# Patient Record
Sex: FEMALE | Race: WHITE | NOT HISPANIC OR LATINO | URBAN - METROPOLITAN AREA
[De-identification: names, ages, dates, MRNs, and addresses within clinical notes are randomized per-mention and may not be internally consistent; named-entity substitution may affect disease eponyms.]

---

## 2015-06-23 RX ORDER — DOCUSATE SODIUM 100 MG
2 CAPSULE ORAL
Qty: 0 | Refills: 0 | COMMUNITY
Start: 2015-06-23

## 2017-05-30 ENCOUNTER — INPATIENT (INPATIENT)
Facility: HOSPITAL | Age: 82
LOS: 2 days | Discharge: NURSING, REHAB OR LONG-TERM CARE | DRG: 312 | End: 2017-06-02
Attending: INTERNAL MEDICINE | Admitting: INTERNAL MEDICINE
Payer: MEDICARE

## 2017-05-30 VITALS
TEMPERATURE: 98 F | HEIGHT: 62 IN | RESPIRATION RATE: 16 BRPM | WEIGHT: 145.06 LBS | HEART RATE: 66 BPM | DIASTOLIC BLOOD PRESSURE: 44 MMHG | OXYGEN SATURATION: 99 % | SYSTOLIC BLOOD PRESSURE: 136 MMHG

## 2017-05-30 DIAGNOSIS — Z98.89 OTHER SPECIFIED POSTPROCEDURAL STATES: Chronic | ICD-10-CM

## 2017-05-30 DIAGNOSIS — R55 SYNCOPE AND COLLAPSE: ICD-10-CM

## 2017-05-30 LAB
ALBUMIN SERPL ELPH-MCNC: 3.6 G/DL — SIGNIFICANT CHANGE UP (ref 3.3–5)
ALP SERPL-CCNC: 85 U/L — SIGNIFICANT CHANGE UP (ref 30–120)
ALT FLD-CCNC: 15 U/L DA — SIGNIFICANT CHANGE UP (ref 10–60)
ANION GAP SERPL CALC-SCNC: 5 MMOL/L — SIGNIFICANT CHANGE UP (ref 5–17)
ANION GAP SERPL CALC-SCNC: 6 MMOL/L — SIGNIFICANT CHANGE UP (ref 5–17)
APPEARANCE UR: CLEAR — SIGNIFICANT CHANGE UP
APTT BLD: 32.1 SEC — SIGNIFICANT CHANGE UP (ref 27.5–37.4)
AST SERPL-CCNC: 16 U/L — SIGNIFICANT CHANGE UP (ref 10–40)
BASOPHILS # BLD AUTO: 0.1 K/UL — SIGNIFICANT CHANGE UP (ref 0–0.2)
BASOPHILS NFR BLD AUTO: 1.1 % — SIGNIFICANT CHANGE UP (ref 0–2)
BILIRUB SERPL-MCNC: 0.2 MG/DL — SIGNIFICANT CHANGE UP (ref 0.2–1.2)
BILIRUB UR-MCNC: NEGATIVE — SIGNIFICANT CHANGE UP
BUN SERPL-MCNC: 26 MG/DL — HIGH (ref 7–23)
BUN SERPL-MCNC: 29 MG/DL — HIGH (ref 7–23)
CALCIUM SERPL-MCNC: 8.6 MG/DL — SIGNIFICANT CHANGE UP (ref 8.4–10.5)
CALCIUM SERPL-MCNC: 9.4 MG/DL — SIGNIFICANT CHANGE UP (ref 8.4–10.5)
CHLORIDE SERPL-SCNC: 106 MMOL/L — SIGNIFICANT CHANGE UP (ref 96–108)
CHLORIDE SERPL-SCNC: 99 MMOL/L — SIGNIFICANT CHANGE UP (ref 96–108)
CK MB BLD-MCNC: 2.1 % — SIGNIFICANT CHANGE UP (ref 0–3.5)
CK MB CFR SERPL CALC: 1.6 NG/ML — SIGNIFICANT CHANGE UP (ref 0–3.6)
CK SERPL-CCNC: 75 U/L — SIGNIFICANT CHANGE UP (ref 26–192)
CO2 SERPL-SCNC: 26 MMOL/L — SIGNIFICANT CHANGE UP (ref 22–31)
CO2 SERPL-SCNC: 29 MMOL/L — SIGNIFICANT CHANGE UP (ref 22–31)
COLOR SPEC: YELLOW — SIGNIFICANT CHANGE UP
CREAT SERPL-MCNC: 1.4 MG/DL — HIGH (ref 0.5–1.3)
CREAT SERPL-MCNC: 1.56 MG/DL — HIGH (ref 0.5–1.3)
DIFF PNL FLD: NEGATIVE — SIGNIFICANT CHANGE UP
EOSINOPHIL # BLD AUTO: 0.1 K/UL — SIGNIFICANT CHANGE UP (ref 0–0.5)
EOSINOPHIL NFR BLD AUTO: 2.1 % — SIGNIFICANT CHANGE UP (ref 0–6)
GLUCOSE SERPL-MCNC: 158 MG/DL — HIGH (ref 70–99)
GLUCOSE SERPL-MCNC: 72 MG/DL — SIGNIFICANT CHANGE UP (ref 70–99)
GLUCOSE UR QL: NEGATIVE MG/DL — SIGNIFICANT CHANGE UP
HCT VFR BLD CALC: 29.7 % — LOW (ref 34.5–45)
HGB BLD-MCNC: 9.8 G/DL — LOW (ref 11.5–15.5)
INR BLD: 0.99 RATIO — SIGNIFICANT CHANGE UP (ref 0.88–1.16)
KETONES UR-MCNC: NEGATIVE — SIGNIFICANT CHANGE UP
LACTATE SERPL-SCNC: 1.3 MMOL/L — SIGNIFICANT CHANGE UP (ref 0.7–2)
LEUKOCYTE ESTERASE UR-ACNC: NEGATIVE — SIGNIFICANT CHANGE UP
LYMPHOCYTES # BLD AUTO: 1.1 K/UL — SIGNIFICANT CHANGE UP (ref 1–3.3)
LYMPHOCYTES # BLD AUTO: 17.3 % — SIGNIFICANT CHANGE UP (ref 13–44)
MAGNESIUM SERPL-MCNC: 2.1 MG/DL — SIGNIFICANT CHANGE UP (ref 1.6–2.6)
MCHC RBC-ENTMCNC: 29 PG — SIGNIFICANT CHANGE UP (ref 27–34)
MCHC RBC-ENTMCNC: 32.8 GM/DL — SIGNIFICANT CHANGE UP (ref 32–36)
MCV RBC AUTO: 88.1 FL — SIGNIFICANT CHANGE UP (ref 80–100)
MONOCYTES # BLD AUTO: 0.7 K/UL — SIGNIFICANT CHANGE UP (ref 0–0.9)
MONOCYTES NFR BLD AUTO: 10.6 % — SIGNIFICANT CHANGE UP (ref 2–14)
NEUTROPHILS # BLD AUTO: 4.4 K/UL — SIGNIFICANT CHANGE UP (ref 1.8–7.4)
NEUTROPHILS NFR BLD AUTO: 68.9 % — SIGNIFICANT CHANGE UP (ref 43–77)
NITRITE UR-MCNC: NEGATIVE — SIGNIFICANT CHANGE UP
PH UR: 7 — SIGNIFICANT CHANGE UP (ref 5–8)
PLATELET # BLD AUTO: 216 K/UL — SIGNIFICANT CHANGE UP (ref 150–400)
POTASSIUM SERPL-MCNC: 4.6 MMOL/L — SIGNIFICANT CHANGE UP (ref 3.5–5.3)
POTASSIUM SERPL-MCNC: 6.2 MMOL/L — CRITICAL HIGH (ref 3.5–5.3)
POTASSIUM SERPL-SCNC: 4.6 MMOL/L — SIGNIFICANT CHANGE UP (ref 3.5–5.3)
POTASSIUM SERPL-SCNC: 6.2 MMOL/L — CRITICAL HIGH (ref 3.5–5.3)
PROT SERPL-MCNC: 7.5 G/DL — SIGNIFICANT CHANGE UP (ref 6–8.3)
PROT UR-MCNC: NEGATIVE MG/DL — SIGNIFICANT CHANGE UP
PROTHROM AB SERPL-ACNC: 10.8 SEC — SIGNIFICANT CHANGE UP (ref 9.8–12.7)
RBC # BLD: 3.38 M/UL — LOW (ref 3.8–5.2)
RBC # FLD: 13.8 % — SIGNIFICANT CHANGE UP (ref 10.3–14.5)
SODIUM SERPL-SCNC: 131 MMOL/L — LOW (ref 135–145)
SODIUM SERPL-SCNC: 140 MMOL/L — SIGNIFICANT CHANGE UP (ref 135–145)
SP GR SPEC: 1 — LOW (ref 1.01–1.02)
TROPONIN I SERPL-MCNC: 0 NG/ML — LOW (ref 0.02–0.06)
UROBILINOGEN FLD QL: NEGATIVE MG/DL — SIGNIFICANT CHANGE UP
WBC # BLD: 6.4 K/UL — SIGNIFICANT CHANGE UP (ref 3.8–10.5)
WBC # FLD AUTO: 6.4 K/UL — SIGNIFICANT CHANGE UP (ref 3.8–10.5)

## 2017-05-30 PROCEDURE — 93010 ELECTROCARDIOGRAM REPORT: CPT

## 2017-05-30 PROCEDURE — 70450 CT HEAD/BRAIN W/O DYE: CPT | Mod: 26

## 2017-05-30 PROCEDURE — 99285 EMERGENCY DEPT VISIT HI MDM: CPT

## 2017-05-30 PROCEDURE — 71010: CPT | Mod: 26

## 2017-05-30 RX ORDER — DEXTROSE 50 % IN WATER 50 %
50 SYRINGE (ML) INTRAVENOUS ONCE
Qty: 0 | Refills: 0 | Status: COMPLETED | OUTPATIENT
Start: 2017-05-30 | End: 2017-05-30

## 2017-05-30 RX ORDER — ALBUTEROL 90 UG/1
2.5 AEROSOL, METERED ORAL ONCE
Qty: 0 | Refills: 0 | Status: COMPLETED | OUTPATIENT
Start: 2017-05-30 | End: 2017-05-30

## 2017-05-30 RX ORDER — SODIUM BICARBONATE 1 MEQ/ML
50 SYRINGE (ML) INTRAVENOUS ONCE
Qty: 0 | Refills: 0 | Status: COMPLETED | OUTPATIENT
Start: 2017-05-30 | End: 2017-05-30

## 2017-05-30 RX ORDER — SODIUM CHLORIDE 9 MG/ML
1000 INJECTION INTRAMUSCULAR; INTRAVENOUS; SUBCUTANEOUS ONCE
Qty: 0 | Refills: 0 | Status: COMPLETED | OUTPATIENT
Start: 2017-05-30 | End: 2017-05-30

## 2017-05-30 RX ORDER — INSULIN HUMAN 100 [IU]/ML
6 INJECTION, SOLUTION SUBCUTANEOUS ONCE
Qty: 0 | Refills: 0 | Status: COMPLETED | OUTPATIENT
Start: 2017-05-30 | End: 2017-05-30

## 2017-05-30 RX ADMIN — SODIUM CHLORIDE 1000 MILLILITER(S): 9 INJECTION INTRAMUSCULAR; INTRAVENOUS; SUBCUTANEOUS at 19:30

## 2017-05-30 RX ADMIN — Medication 50 MILLILITER(S): at 21:00

## 2017-05-30 RX ADMIN — INSULIN HUMAN 6 UNIT(S): 100 INJECTION, SOLUTION SUBCUTANEOUS at 21:13

## 2017-05-30 RX ADMIN — Medication 50 MILLIEQUIVALENT(S): at 20:55

## 2017-05-30 RX ADMIN — SODIUM CHLORIDE 1000 MILLILITER(S): 9 INJECTION INTRAMUSCULAR; INTRAVENOUS; SUBCUTANEOUS at 21:09

## 2017-05-30 RX ADMIN — Medication 50 MILLIEQUIVALENT(S): at 21:07

## 2017-05-30 RX ADMIN — ALBUTEROL 2.5 MILLIGRAM(S): 90 AEROSOL, METERED ORAL at 21:23

## 2017-05-30 NOTE — ED ADULT NURSE NOTE - CHPI ED SYMPTOMS NEG
no decreased eating/drinking/no vomiting/no fever/no numbness/no nausea/no tingling/no chills/no pain

## 2017-05-30 NOTE — ED PROVIDER NOTE - PMH
Dementia    Diabetes    Diabetes mellitus    Esophageal ulceration    High cholesterol    Hyperlipidemia    Hypertension    Hypertension    Hypothyroid    Hypothyroid    Sleep apnea

## 2017-05-30 NOTE — ED PROVIDER NOTE - NS ED MD SCRIBE ATTENDING SCRIBE SECTIONS
HISTORY OF PRESENT ILLNESS/DISPOSITION/PHYSICAL EXAM/VITAL SIGNS( Pullset)/REVIEW OF SYSTEMS/PAST MEDICAL/SURGICAL/SOCIAL HISTORY

## 2017-05-30 NOTE — ED PROVIDER NOTE - OBJECTIVE STATEMENT
89 y/o F pt with hx of HTN, HLD, Diabetes, Dementia presents to ED BIBA from nursing home for a period of unresponsiveness per EMS. Per EMS pt has been weak and not acting herself. Hx is limited from pt secondary to medical hx of Dementia.

## 2017-05-30 NOTE — ED PROVIDER NOTE - NEUROLOGICAL, MLM
Alert, following commands.  no focal deficits, no motor or sensory deficits. wakens to voice, drowsy, following simple commands.  no focal deficits, no motor or sensory deficits.

## 2017-05-31 DIAGNOSIS — R53.1 WEAKNESS: ICD-10-CM

## 2017-05-31 DIAGNOSIS — F32.9 MAJOR DEPRESSIVE DISORDER, SINGLE EPISODE, UNSPECIFIED: ICD-10-CM

## 2017-05-31 DIAGNOSIS — I10 ESSENTIAL (PRIMARY) HYPERTENSION: ICD-10-CM

## 2017-05-31 DIAGNOSIS — E78.00 PURE HYPERCHOLESTEROLEMIA, UNSPECIFIED: ICD-10-CM

## 2017-05-31 DIAGNOSIS — E03.9 HYPOTHYROIDISM, UNSPECIFIED: ICD-10-CM

## 2017-05-31 DIAGNOSIS — N17.9 ACUTE KIDNEY FAILURE, UNSPECIFIED: ICD-10-CM

## 2017-05-31 DIAGNOSIS — G47.30 SLEEP APNEA, UNSPECIFIED: ICD-10-CM

## 2017-05-31 DIAGNOSIS — F03.90 UNSPECIFIED DEMENTIA, UNSPECIFIED SEVERITY, WITHOUT BEHAVIORAL DISTURBANCE, PSYCHOTIC DISTURBANCE, MOOD DISTURBANCE, AND ANXIETY: ICD-10-CM

## 2017-05-31 DIAGNOSIS — E11.9 TYPE 2 DIABETES MELLITUS WITHOUT COMPLICATIONS: ICD-10-CM

## 2017-05-31 DIAGNOSIS — R55 SYNCOPE AND COLLAPSE: ICD-10-CM

## 2017-05-31 DIAGNOSIS — E87.5 HYPERKALEMIA: ICD-10-CM

## 2017-05-31 LAB
HBA1C BLD-MCNC: 6 % — HIGH (ref 4–5.6)
HCT VFR BLD CALC: 30.3 % — LOW (ref 34.5–45)
HGB BLD-MCNC: 9.7 G/DL — LOW (ref 11.5–15.5)
MCHC RBC-ENTMCNC: 27.7 PG — SIGNIFICANT CHANGE UP (ref 27–34)
MCHC RBC-ENTMCNC: 32 GM/DL — SIGNIFICANT CHANGE UP (ref 32–36)
MCV RBC AUTO: 86.7 FL — SIGNIFICANT CHANGE UP (ref 80–100)
PLATELET # BLD AUTO: 213 K/UL — SIGNIFICANT CHANGE UP (ref 150–400)
RBC # BLD: 3.5 M/UL — LOW (ref 3.8–5.2)
RBC # FLD: 14.4 % — SIGNIFICANT CHANGE UP (ref 10.3–14.5)
TROPONIN I SERPL-MCNC: 0 NG/ML — LOW (ref 0.02–0.06)
WBC # BLD: 5 K/UL — SIGNIFICANT CHANGE UP (ref 3.8–10.5)
WBC # FLD AUTO: 5 K/UL — SIGNIFICANT CHANGE UP (ref 3.8–10.5)

## 2017-05-31 PROCEDURE — 76775 US EXAM ABDO BACK WALL LIM: CPT | Mod: 26

## 2017-05-31 PROCEDURE — 99223 1ST HOSP IP/OBS HIGH 75: CPT | Mod: AI

## 2017-05-31 RX ORDER — ONDANSETRON 8 MG/1
4 TABLET, FILM COATED ORAL EVERY 8 HOURS
Qty: 0 | Refills: 0 | Status: DISCONTINUED | OUTPATIENT
Start: 2017-05-31 | End: 2017-06-02

## 2017-05-31 RX ORDER — DONEPEZIL HYDROCHLORIDE 10 MG/1
10 TABLET, FILM COATED ORAL AT BEDTIME
Qty: 0 | Refills: 0 | Status: DISCONTINUED | OUTPATIENT
Start: 2017-05-31 | End: 2017-05-31

## 2017-05-31 RX ORDER — METOPROLOL TARTRATE 50 MG
12.5 TABLET ORAL
Qty: 0 | Refills: 0 | Status: DISCONTINUED | OUTPATIENT
Start: 2017-05-31 | End: 2017-06-02

## 2017-05-31 RX ORDER — LEVOTHYROXINE SODIUM 125 MCG
75 TABLET ORAL DAILY
Qty: 0 | Refills: 0 | Status: DISCONTINUED | OUTPATIENT
Start: 2017-05-31 | End: 2017-06-02

## 2017-05-31 RX ORDER — DEXTROSE 50 % IN WATER 50 %
25 SYRINGE (ML) INTRAVENOUS ONCE
Qty: 0 | Refills: 0 | Status: DISCONTINUED | OUTPATIENT
Start: 2017-05-31 | End: 2017-06-02

## 2017-05-31 RX ORDER — SERTRALINE 25 MG/1
50 TABLET, FILM COATED ORAL DAILY
Qty: 0 | Refills: 0 | Status: DISCONTINUED | OUTPATIENT
Start: 2017-05-31 | End: 2017-06-02

## 2017-05-31 RX ORDER — SODIUM CHLORIDE 9 MG/ML
1000 INJECTION INTRAMUSCULAR; INTRAVENOUS; SUBCUTANEOUS
Qty: 0 | Refills: 0 | Status: DISCONTINUED | OUTPATIENT
Start: 2017-05-31 | End: 2017-06-01

## 2017-05-31 RX ORDER — OXYBUTYNIN CHLORIDE 5 MG
5 TABLET ORAL DAILY
Qty: 0 | Refills: 0 | Status: DISCONTINUED | OUTPATIENT
Start: 2017-05-31 | End: 2017-06-02

## 2017-05-31 RX ORDER — MODAFINIL 200 MG/1
200 TABLET ORAL
Qty: 0 | Refills: 0 | Status: DISCONTINUED | OUTPATIENT
Start: 2017-05-31 | End: 2017-06-02

## 2017-05-31 RX ORDER — GLUCAGON INJECTION, SOLUTION 0.5 MG/.1ML
1 INJECTION, SOLUTION SUBCUTANEOUS ONCE
Qty: 0 | Refills: 0 | Status: DISCONTINUED | OUTPATIENT
Start: 2017-05-31 | End: 2017-06-02

## 2017-05-31 RX ORDER — CHOLESTYRAMINE 4 G/9G
4 POWDER, FOR SUSPENSION ORAL DAILY
Qty: 0 | Refills: 0 | Status: DISCONTINUED | OUTPATIENT
Start: 2017-05-31 | End: 2017-06-02

## 2017-05-31 RX ORDER — DOCUSATE SODIUM 100 MG
200 CAPSULE ORAL DAILY
Qty: 0 | Refills: 0 | Status: DISCONTINUED | OUTPATIENT
Start: 2017-05-31 | End: 2017-06-02

## 2017-05-31 RX ORDER — PANTOPRAZOLE SODIUM 20 MG/1
40 TABLET, DELAYED RELEASE ORAL
Qty: 0 | Refills: 0 | Status: DISCONTINUED | OUTPATIENT
Start: 2017-05-31 | End: 2017-06-02

## 2017-05-31 RX ORDER — DEXTROSE 50 % IN WATER 50 %
12.5 SYRINGE (ML) INTRAVENOUS ONCE
Qty: 0 | Refills: 0 | Status: DISCONTINUED | OUTPATIENT
Start: 2017-05-31 | End: 2017-06-02

## 2017-05-31 RX ORDER — DEXTROSE 50 % IN WATER 50 %
1 SYRINGE (ML) INTRAVENOUS ONCE
Qty: 0 | Refills: 0 | Status: DISCONTINUED | OUTPATIENT
Start: 2017-05-31 | End: 2017-06-02

## 2017-05-31 RX ORDER — HEPARIN SODIUM 5000 [USP'U]/ML
5000 INJECTION INTRAVENOUS; SUBCUTANEOUS EVERY 8 HOURS
Qty: 0 | Refills: 0 | Status: DISCONTINUED | OUTPATIENT
Start: 2017-05-31 | End: 2017-06-02

## 2017-05-31 RX ORDER — INSULIN LISPRO 100/ML
VIAL (ML) SUBCUTANEOUS
Qty: 0 | Refills: 0 | Status: DISCONTINUED | OUTPATIENT
Start: 2017-05-31 | End: 2017-06-02

## 2017-05-31 RX ORDER — ENOXAPARIN SODIUM 100 MG/ML
40 INJECTION SUBCUTANEOUS DAILY
Qty: 0 | Refills: 0 | Status: DISCONTINUED | OUTPATIENT
Start: 2017-05-31 | End: 2017-05-31

## 2017-05-31 RX ORDER — FLUTICASONE PROPIONATE 50 MCG
1 SPRAY, SUSPENSION NASAL DAILY
Qty: 0 | Refills: 0 | Status: DISCONTINUED | OUTPATIENT
Start: 2017-05-31 | End: 2017-06-02

## 2017-05-31 RX ORDER — LISINOPRIL 2.5 MG/1
20 TABLET ORAL DAILY
Qty: 0 | Refills: 0 | Status: DISCONTINUED | OUTPATIENT
Start: 2017-05-31 | End: 2017-05-31

## 2017-05-31 RX ORDER — SODIUM CHLORIDE 9 MG/ML
1000 INJECTION, SOLUTION INTRAVENOUS
Qty: 0 | Refills: 0 | Status: DISCONTINUED | OUTPATIENT
Start: 2017-05-31 | End: 2017-06-02

## 2017-05-31 RX ORDER — FOLIC ACID 0.8 MG
1 TABLET ORAL DAILY
Qty: 0 | Refills: 0 | Status: DISCONTINUED | OUTPATIENT
Start: 2017-05-31 | End: 2017-06-02

## 2017-05-31 RX ADMIN — SERTRALINE 50 MILLIGRAM(S): 25 TABLET, FILM COATED ORAL at 11:56

## 2017-05-31 RX ADMIN — Medication 200 MILLIGRAM(S): at 11:56

## 2017-05-31 RX ADMIN — Medication 1 TABLET(S): at 17:55

## 2017-05-31 RX ADMIN — Medication 12.5 MILLIGRAM(S): at 17:55

## 2017-05-31 RX ADMIN — Medication 1 TABLET(S): at 05:52

## 2017-05-31 RX ADMIN — ONDANSETRON 4 MILLIGRAM(S): 8 TABLET, FILM COATED ORAL at 01:05

## 2017-05-31 RX ADMIN — PANTOPRAZOLE SODIUM 40 MILLIGRAM(S): 20 TABLET, DELAYED RELEASE ORAL at 05:58

## 2017-05-31 RX ADMIN — Medication 1 MILLIGRAM(S): at 11:56

## 2017-05-31 RX ADMIN — HEPARIN SODIUM 5000 UNIT(S): 5000 INJECTION INTRAVENOUS; SUBCUTANEOUS at 05:52

## 2017-05-31 RX ADMIN — Medication 5 MILLIGRAM(S): at 11:56

## 2017-05-31 RX ADMIN — Medication 12.5 MILLIGRAM(S): at 05:57

## 2017-05-31 RX ADMIN — LISINOPRIL 20 MILLIGRAM(S): 2.5 TABLET ORAL at 05:54

## 2017-05-31 RX ADMIN — CHOLESTYRAMINE 4 GRAM(S): 4 POWDER, FOR SUSPENSION ORAL at 08:26

## 2017-05-31 RX ADMIN — Medication 1 SPRAY(S): at 11:57

## 2017-05-31 RX ADMIN — HEPARIN SODIUM 5000 UNIT(S): 5000 INJECTION INTRAVENOUS; SUBCUTANEOUS at 14:39

## 2017-05-31 RX ADMIN — SODIUM CHLORIDE 50 MILLILITER(S): 9 INJECTION INTRAMUSCULAR; INTRAVENOUS; SUBCUTANEOUS at 22:51

## 2017-05-31 RX ADMIN — MODAFINIL 200 MILLIGRAM(S): 200 TABLET ORAL at 05:57

## 2017-05-31 RX ADMIN — Medication 75 MICROGRAM(S): at 05:53

## 2017-05-31 NOTE — DISCHARGE NOTE ADULT - CARE PROVIDER_API CALL
Daniela Alas (MBBS; MD), Internal Medicine  [address] [place] [address]  Phone: [phone]  Fax: [phone]

## 2017-05-31 NOTE — DISCHARGE NOTE ADULT - NS AS DC STROKE ED MATERIALS
Stroke Education Booklet/Call 911 for Stroke/Risk Factors for Stroke/High Blood pressure is a risk factor for Strokes/Stroke Warning Signs and Symptoms/Need for Followup After Discharge/Prescribed Medications High Blood pressure is a risk factor for Strokes

## 2017-05-31 NOTE — H&P ADULT - NSHPPHYSICALEXAM_GEN_ALL_CORE
PHYSICAL EXAM-  GENERAL: NAD, average groomed, well-developed  HEAD:  Atraumatic, Normocephalic  EYES: EOMI, PERRLA, conjunctiva and sclera clear  NECK: Supple, No JVD, Normal thyroid  NERVOUS SYSTEM:  Alert & Oriented X3, Motor Strength 3 -4 /5 B/L upper and lower extremities; DTRs 2+ intact and symmetric  CHEST/LUNG: Clear to percussion bilaterally; No rales, rhonchi, wheezing, or rubs  HEART: Regular rate and rhythm; No murmurs, rubs, or gallops  ABDOMEN: Soft, Nontender, Nondistended; Bowel sounds present  EXTREMITIES:  2+ Peripheral Pulses, No clubbing, cyanosis, or edema  SKIN: No rashes or lesions

## 2017-05-31 NOTE — DISCHARGE NOTE ADULT - REASON FOR ADMISSION
89 y/o F pt with hx of HTN, HLD, Diabetes, Dementia presents to ED BIBA from nursing home for a period of unresponsiveness per EMS. Per EMS pt has been weak and not acting herself. Hx is limited from pt secondary to medical hx of Dementia. 89 y/o F pt with hx of HTN, HLD, Diabetes, Dementia presents to ED BIBA from nursing home for a period of unresponsiveness per EMS.

## 2017-05-31 NOTE — H&P ADULT - ASSESSMENT
89 y/o F pt with hx of HTN, HLD, Diabetes, Dementia presents to ED BIBA from nursing home for unresponsiveness and syncope. No history of fall or injury.

## 2017-05-31 NOTE — DISCHARGE NOTE ADULT - CARE PLAN
Principal Discharge DX:	Syncope, unspecified syncope type  Goal:	no more passing out  Instructions for follow-up, activity and diet:	f/u blood pressure check.    PT eval and treat.  Secondary Diagnosis:	Essential hypertension  Instructions for follow-up, activity and diet:	the Lisinopril dose was lowered in the hospital.   please have patient seen by PMD within 10 days and medications re-evaluated  Secondary Diagnosis:	Acquired hypothyroidism  Instructions for follow-up, activity and diet:	TSH was elevated in the hospital.   Please have PMD recheck thyroid tests.

## 2017-05-31 NOTE — DISCHARGE NOTE ADULT - MEDICATION SUMMARY - MEDICATIONS TO TAKE
I will START or STAY ON the medications listed below when I get home from the hospital:    gentamicin nasal sinus rinse  --  into nose 2 times a day  -- Indication: For rhitnitis    lisinopril 5 mg oral tablet  -- 1 tab(s) by mouth once a day  -- Do not take this drug if you are pregnant.  It is very important that you take or use this exactly as directed.  Do not skip doses or discontinue unless directed by your doctor.  Some non-prescription drugs may aggravate your condition.  Read all labels carefully.  If a warning appears, check with your doctor before taking.    -- Indication: For Hypertension    sertraline 50 mg oral tablet  -- 1 tab(s) by mouth once a day  -- Indication: For Depression    Januvia 100 mg oral tablet  -- 1 tab(s) by mouth once a day  -- Indication: For DM    glimepiride 1 mg oral tablet  -- 1 tab(s) by mouth once a day  -- Indication: For DM    Questran Light 4 g/5 g oral powder for reconstitution  --  by mouth once a day in 6oz water  -- Indication: For GI    metoprolol tartrate 25 mg oral tablet  -- 0.5 tab(s) by mouth 2 times a day  -- Indication: For Hypertension    donepezil 10 mg oral tablet  -- 1 tab(s) by mouth once a day (at bedtime)  -- Indication: For Dementia    modafinil 200 mg oral tablet  -- 1 tab(s) by mouth once a day (before a meal)  -- Indication: For Dementia    docusate sodium 100 mg oral capsule  -- 2 cap(s) by mouth once a day  -- Indication: For constipation    Saline Nasal Mist  -- 1 spray(s) in each nostril once a day  -- Indication: For rhinitis    Flonase 50 mcg/inh nasal spray  -- 1 spray(s) into nose once a day  -- Indication: For rhinitis    omeprazole 20 mg oral delayed release capsule  -- 1 cap(s) by mouth once a day  -- Indication: For gerd    levothyroxine 75 mcg (0.075 mg) oral capsule  -- 1 cap(s) by mouth once a day  -- Indication: For Hypothyroid    oxybutynin 5 mg oral tablet  -- 1 tab(s) by mouth once a day  -- Indication: For overactive bladder    Calcium 600+D 600 mg-200 units oral tablet  --  by mouth 2 times a day  -- Indication: For vitamin    folic acid 1 mg oral tablet  -- 1 tab(s) by mouth once a day  -- Indication: For vitamin I will START or STAY ON the medications listed below when I get home from the hospital:    gentamicin nasal sinus rinse  --  into nose 2 times a day  -- Indication: For rhinitis    PT eval and treat.   -- Indication: For pt eval    lisinopril 5 mg oral tablet  -- 1 tab(s) by mouth once a day  -- Do not take this drug if you are pregnant.  It is very important that you take or use this exactly as directed.  Do not skip doses or discontinue unless directed by your doctor.  Some non-prescription drugs may aggravate your condition.  Read all labels carefully.  If a warning appears, check with your doctor before taking.    -- Indication: For Hypertension    sertraline 50 mg oral tablet  -- 1 tab(s) by mouth once a day  -- Indication: For Depression    Januvia 100 mg oral tablet  -- 1 tab(s) by mouth once a day  -- Indication: For DM    glimepiride 1 mg oral tablet  -- 1 tab(s) by mouth once a day  -- Indication: For DM    Questran Light 4 g/5 g oral powder for reconstitution  --  by mouth once a day in 6oz water  -- Indication: For GI    metoprolol tartrate 25 mg oral tablet  -- 0.5 tab(s) by mouth 2 times a day  -- Indication: For Hypertension    donepezil 10 mg oral tablet  -- 1 tab(s) by mouth once a day (at bedtime)  -- Indication: For Dementia    modafinil 200 mg oral tablet  -- 1 tab(s) by mouth once a day (before a meal)  -- Indication: For Dementia    docusate sodium 100 mg oral capsule  -- 2 cap(s) by mouth once a day  -- Indication: For Stool softener    Saline Nasal Mist  -- 1 spray(s) in each nostril once a day  -- Indication: For rhinitis    Flonase 50 mcg/inh nasal spray  -- 1 spray(s) into nose once a day  -- Indication: For rhinitis    omeprazole 20 mg oral delayed release capsule  -- 1 cap(s) by mouth once a day  -- Indication: For GERD    levothyroxine 75 mcg (0.075 mg) oral capsule  -- 1 cap(s) by mouth once a day  -- Indication: For Hypothyroid    oxybutynin 5 mg oral tablet  -- 1 tab(s) by mouth once a day  -- Indication: For overactive bladder    Calcium 600+D 600 mg-200 units oral tablet  --  by mouth 2 times a day  -- Indication: For vitamin    folic acid 1 mg oral tablet  -- 1 tab(s) by mouth once a day  -- Indication: For vitamin

## 2017-05-31 NOTE — DISCHARGE NOTE ADULT - PLAN OF CARE
no more passing out f/u blood pressure check.    PT eval and treat. the Lisinopril dose was lowered in the hospital.   please have patient seen by PMD within 10 days and medications re-evaluated TSH was elevated in the hospital.   Please have PMD recheck thyroid tests.

## 2017-05-31 NOTE — H&P ADULT - NSHPLABSRESULTS_GEN_ALL_CORE
CBC Full  -  ( 30 May 2017 19:51 )  WBC Count : 6.4 K/uL  Hemoglobin : 9.8 g/dL  Hematocrit : 29.7 %  Platelet Count - Automated : 216 K/uL  Mean Cell Volume : 88.1 fl  Mean Cell Hemoglobin : 29.0 pg  Mean Cell Hemoglobin Concentration : 32.8 gm/dL  Auto Neutrophil # : 4.4 K/uL  Auto Lymphocyte # : 1.1 K/uL  Auto Monocyte # : 0.7 K/uL  Auto Eosinophil # : 0.1 K/uL  Auto Basophil # : 0.1 K/uL  Auto Neutrophil % : 68.9 %  Auto Lymphocyte % : 17.3 %  Auto Monocyte % : 10.6 %  Auto Eosinophil % : 2.1 %  Auto Basophil % : 1.1 %    05-30    140  |  106  |  26<H>  ----------------------------<  72  4.6   |  29  |  1.40<H>    Ca    8.6      30 May 2017 23:04  Mg     2.1     05-30    TPro  7.5  /  Alb  3.6  /  TBili  0.2  /  DBili  x   /  AST  16  /  ALT  15  /  AlkPhos  85  05-30

## 2017-05-31 NOTE — H&P ADULT - PROBLEM SELECTOR PLAN 1
- Admit to Tele  - CT head done - no acute abnormality  - Neuro evaluation - Dr Buchanan  - Troponin x 3

## 2017-05-31 NOTE — DISCHARGE NOTE ADULT - HOSPITAL COURSE
89 y/o F pt with hx of HTN, HLD, Diabetes, Dementia presents to ED BIBA from nursing home for a period of unresponsiveness per EMS. Per EMS pt has been weak and not acting herself. Hx is limited from pt secondary to medical hx of Dementia.      Patient was admitted to telemetry.  She was seen by Dr Amaro from cardiology.  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 Left atrium at the upper for normal with associated mild mitral regurgitation. Trace aortic insufficiency. Mild tricuspid insufficiency with a pulmonary artery systolic pressure of about 38 mm of Mercury.  Evidence for some mild left ventricular diastolic dysfunction.  She had no telemetry events.  She is not orthostatic.  On admission her Lisinopril was held due to low BP, But then her blood pressure is high.  She will restart at a lower dose and have a BP check with primary some time next week.   She was seen by PT, and will get a prescription for outpatient PT.  She will be discharged back to assisted living.

## 2017-05-31 NOTE — DISCHARGE NOTE ADULT - PATIENT PORTAL LINK FT
“You can access the [employer] Patient Portal, offered by [facility], by registering with the following website: [email or web address]”

## 2017-06-01 DIAGNOSIS — R94.31 ABNORMAL ELECTROCARDIOGRAM [ECG] [EKG]: ICD-10-CM

## 2017-06-01 LAB
ANION GAP SERPL CALC-SCNC: 6 MMOL/L — SIGNIFICANT CHANGE UP (ref 5–17)
BUN SERPL-MCNC: 22 MG/DL — SIGNIFICANT CHANGE UP (ref 7–23)
CALCIUM SERPL-MCNC: 8.9 MG/DL — SIGNIFICANT CHANGE UP (ref 8.4–10.5)
CHLORIDE SERPL-SCNC: 106 MMOL/L — SIGNIFICANT CHANGE UP (ref 96–108)
CO2 SERPL-SCNC: 29 MMOL/L — SIGNIFICANT CHANGE UP (ref 22–31)
CREAT SERPL-MCNC: 1.5 MG/DL — HIGH (ref 0.5–1.3)
FOLATE SERPL-MCNC: >20 NG/ML — SIGNIFICANT CHANGE UP (ref 4.8–24.2)
GLUCOSE SERPL-MCNC: 94 MG/DL — SIGNIFICANT CHANGE UP (ref 70–99)
HCT VFR BLD CALC: 31.5 % — LOW (ref 34.5–45)
HGB BLD-MCNC: 10.9 G/DL — LOW (ref 11.5–15.5)
MCHC RBC-ENTMCNC: 30.7 PG — SIGNIFICANT CHANGE UP (ref 27–34)
MCHC RBC-ENTMCNC: 34.5 GM/DL — SIGNIFICANT CHANGE UP (ref 32–36)
MCV RBC AUTO: 89 FL — SIGNIFICANT CHANGE UP (ref 80–100)
PLATELET # BLD AUTO: 212 K/UL — SIGNIFICANT CHANGE UP (ref 150–400)
POTASSIUM SERPL-MCNC: 4.3 MMOL/L — SIGNIFICANT CHANGE UP (ref 3.5–5.3)
POTASSIUM SERPL-SCNC: 4.3 MMOL/L — SIGNIFICANT CHANGE UP (ref 3.5–5.3)
RBC # BLD: 3.54 M/UL — LOW (ref 3.8–5.2)
RBC # FLD: 14.3 % — SIGNIFICANT CHANGE UP (ref 10.3–14.5)
SODIUM SERPL-SCNC: 141 MMOL/L — SIGNIFICANT CHANGE UP (ref 135–145)
TSH SERPL-MCNC: 10.62 UIU/ML — HIGH (ref 0.27–4.2)
VIT B12 SERPL-MCNC: 360 PG/ML — SIGNIFICANT CHANGE UP (ref 243–894)
WBC # BLD: 4.7 K/UL — SIGNIFICANT CHANGE UP (ref 3.8–10.5)
WBC # FLD AUTO: 4.7 K/UL — SIGNIFICANT CHANGE UP (ref 3.8–10.5)

## 2017-06-01 PROCEDURE — 93010 ELECTROCARDIOGRAM REPORT: CPT

## 2017-06-01 PROCEDURE — 99233 SBSQ HOSP IP/OBS HIGH 50: CPT

## 2017-06-01 PROCEDURE — 99223 1ST HOSP IP/OBS HIGH 75: CPT

## 2017-06-01 RX ADMIN — Medication 1 TABLET(S): at 06:29

## 2017-06-01 RX ADMIN — MODAFINIL 200 MILLIGRAM(S): 200 TABLET ORAL at 06:28

## 2017-06-01 RX ADMIN — Medication 12.5 MILLIGRAM(S): at 06:28

## 2017-06-01 RX ADMIN — HEPARIN SODIUM 5000 UNIT(S): 5000 INJECTION INTRAVENOUS; SUBCUTANEOUS at 21:41

## 2017-06-01 RX ADMIN — Medication 5 MILLIGRAM(S): at 14:53

## 2017-06-01 RX ADMIN — SERTRALINE 50 MILLIGRAM(S): 25 TABLET, FILM COATED ORAL at 14:53

## 2017-06-01 RX ADMIN — Medication 200 MILLIGRAM(S): at 14:52

## 2017-06-01 RX ADMIN — HEPARIN SODIUM 5000 UNIT(S): 5000 INJECTION INTRAVENOUS; SUBCUTANEOUS at 06:30

## 2017-06-01 RX ADMIN — PANTOPRAZOLE SODIUM 40 MILLIGRAM(S): 20 TABLET, DELAYED RELEASE ORAL at 06:28

## 2017-06-01 RX ADMIN — Medication 1 SPRAY(S): at 14:53

## 2017-06-01 RX ADMIN — CHOLESTYRAMINE 4 GRAM(S): 4 POWDER, FOR SUSPENSION ORAL at 08:48

## 2017-06-01 RX ADMIN — HEPARIN SODIUM 5000 UNIT(S): 5000 INJECTION INTRAVENOUS; SUBCUTANEOUS at 14:52

## 2017-06-01 RX ADMIN — Medication 75 MICROGRAM(S): at 06:28

## 2017-06-01 RX ADMIN — Medication 1 MILLIGRAM(S): at 14:53

## 2017-06-01 RX ADMIN — Medication 1 TABLET(S): at 18:07

## 2017-06-01 RX ADMIN — Medication 12.5 MILLIGRAM(S): at 18:07

## 2017-06-01 NOTE — CONSULT NOTE ADULT - PROBLEM SELECTOR RECOMMENDATION 3
PVCS couplets ,   without chest pain ,    will repeat EKG , echocardiogram to asses left ventricular function

## 2017-06-01 NOTE — CONSULT NOTE ADULT - PROBLEM SELECTOR RECOMMENDATION 9
Unclear , possible hypoglycemia? dehydration , delayed orthostasis ,less likely  arrhythmia related , encourage po fluid intake , avoid hypoglycemic

## 2017-06-01 NOTE — CONSULT NOTE ADULT - PROBLEM SELECTOR RECOMMENDATION 2
Mild elevated continue lisinopril , low dose lopressor , monitor renal function , check orthostatic BP

## 2017-06-01 NOTE — PROGRESS NOTE ADULT - PROBLEM SELECTOR PLAN 10
Likely multifactorial, dehydration and deconditioning.   PT eval done - pt walked better today
Likely multifactorial, dehydration and deconditioning.   PT eval pending.

## 2017-06-01 NOTE — CONSULT NOTE ADULT - SUBJECTIVE AND OBJECTIVE BOX
loc-- unresponsiveness ? narcolepsy  attack  vs syncope with prolonged cerebral hypoperfusion  continue home meds  no signs of CVA no history of sz   tele eval  neurology wise stable   spoke to son
PCP:    REQUESTING PHYSICIAN:    REASON FOR CONSULT:    CHIEF COMPLAINT:    HPI:89 year old female with hypertension ,DM  HLD ,advanced dementia , who was noted to unresponsive ,lethargic , but was breathing after her meal , patient  blood pressure noted 140/80 ,heart rate was 74  saturation was 99% patient blood sugar noted in ER was in 74 , Patient was admitted to telemetry ,her blood pressure is mild elevated , no evidence of hypotension , cardiac rhythm was sinus with episodes of PVS , couplets , patient hydrated , renal function improving , Patient can not give details ,        No additional history available from patient - dementia (31 May 2017 01:11)      PAST MEDICAL & SURGICAL HISTORY:  High cholesterol  Diabetes  Hypothyroid  Hypertension  Dementia  Sleep apnea  Esophageal ulceration  Hypertension  Hyperlipidemia  Hypothyroid  Diabetes mellitus  Status post hip surgery  H/O kyphoplasty    Allergies    Tagamet (Unknown)    Intolerances        SOCIAL HISTORY: never smoker     FAMILY HISTORY:  No pertinent family history in first degree relatives      MEDICATIONS:  MEDICATIONS  (STANDING):  sertraline 50milliGRAM(s) Oral daily  cholestyramine Powder (Sugar-Free) 4Gram(s) Oral daily  metoprolol 12.5milliGRAM(s) Oral two times a day  modafinil 200milliGRAM(s) Oral before breakfast  docusate sodium 200milliGRAM(s) Oral daily  fluticasone propionate 50 MICROgram(s)/spray Nasal Spray 1Spray(s) Both Nostrils daily  pantoprazole    Tablet 40milliGRAM(s) Oral before breakfast  levothyroxine 75MICROGram(s) Oral daily  oxybutynin 5milliGRAM(s) Oral daily  calcium carbonate  625 mG + Vitamin D (OsCal 250 + D) 1Tablet(s) Oral two times a day  folic acid 1milliGRAM(s) Oral daily  heparin  Injectable 5000Unit(s) SubCutaneous every 8 hours  insulin lispro (HumaLOG) corrective regimen sliding scale  SubCutaneous three times a day before meals  dextrose 5%. 1000milliLiter(s) IV Continuous <Continuous>  dextrose 50% Injectable 12.5Gram(s) IV Push once  dextrose 50% Injectable 25Gram(s) IV Push once  dextrose 50% Injectable 25Gram(s) IV Push once    MEDICATIONS  (PRN):  ondansetron Injectable 4milliGRAM(s) IV Push every 8 hours PRN Nausea and/or Vomiting  dextrose Gel 1Dose(s) Oral once PRN Blood Glucose LESS THAN 70 milliGRAM(s)/deciliter  glucagon  Injectable 1milliGRAM(s) IntraMuscular once PRN Glucose LESS THAN 70 milligrams/deciliter      REVIEW OF SYSTEMS:    Patient is confused All other review of systems is negative unless indicated above    Vital Signs Last 24 Hrs  T(C): 36.9, Max: 37.1 ( @ 17:20)  T(F): 98.5, Max: 98.7 ( @ 17:20)  HR: 70 (60 - 71)  BP: 151/66 (151/66 - 175/74)  BP(mean): --  RR: 17 (16 - 18)  SpO2: 95% (94% - 97%)    I&O's Summary  I & Os for 24h ending 2017 07:00  =============================================  IN: 620 ml / OUT: 0 ml / NET: 620 ml    I & Os for current day (as of 2017 12:16)  =============================================  IN: 360 ml / OUT: 0 ml / NET: 360 ml      PHYSICAL EXAM:    Constitutional: NAD, awake and alert, well-developed  HEENT: PERR, EOMI,  No oral cyanosis.  Neck:  supple,  No JVD  Respiratory: Breath sounds are clear bilaterally, No wheezing, rales or rhonchi  Cardiovascular: S1 and S2, regular rate and rhythm, no Murmurs, gallops or rubs  Gastrointestinal: Bowel Sounds present, soft, nontender.   Extremities: No peripheral edema. No clubbing or cyanosis.  Vascular: 2+ peripheral pulses  Neurological: A/O x 1,unsteady gait   Musculoskeletal: no calf tenderness.  Skin: No rashes.      LABS: All Labs Reviewed:                        10.9   4.7   )-----------( 212      ( 2017 07:37 )             31.5                         9.7    5.0   )-----------( 213      ( 31 May 2017 14:34 )             30.3                         9.8    6.4   )-----------( 216      ( 30 May 2017 19:51 )             29.7     2017 07:37    141    |  106    |  22     ----------------------------<  94     4.3     |  29     |  1.50   31 May 2017 07:49    141    |  107    |  24     ----------------------------<  114    5.3     |  24     |  1.42   30 May 2017 23:04    140    |  106    |  26     ----------------------------<  72     4.6     |  29     |  1.40     Ca    8.9        2017 07:37  Ca    8.7        31 May 2017 07:49  Ca    8.6        30 May 2017 23:04  Mg     2.1       30 May 2017 19:50    TPro  7.5    /  Alb  3.6    /  TBili  0.2    /  DBili  x      /  AST  16     /  ALT  15     /  AlkPhos  85     30 May 2017 19:50    PT/INR - ( 30 May 2017 19:51 )   PT: 10.8 sec;   INR: 0.99 ratio         PTT - ( 30 May 2017 19:51 )  PTT:32.1 sec  CARDIAC MARKERS ( 31 May 2017 07:49 )  .000 ng/mL / x     / x     / x     / x      CARDIAC MARKERS ( 30 May 2017 19:50 )  .000 ng/mL / x     / 75 U/L / x     / 1.6 ng/mL              RADIOLOGY/EK2017  Sinus rhythm First degree AV block ,left axis RBBB   No prior EKG to compare     Monitor : sinus rhythm PVCS occasional ventricular couplets

## 2017-06-02 VITALS
HEART RATE: 64 BPM | SYSTOLIC BLOOD PRESSURE: 128 MMHG | OXYGEN SATURATION: 99 % | DIASTOLIC BLOOD PRESSURE: 71 MMHG | RESPIRATION RATE: 16 BRPM | TEMPERATURE: 98 F

## 2017-06-02 PROCEDURE — 97530 THERAPEUTIC ACTIVITIES: CPT

## 2017-06-02 PROCEDURE — 82553 CREATINE MB FRACTION: CPT

## 2017-06-02 PROCEDURE — 97110 THERAPEUTIC EXERCISES: CPT

## 2017-06-02 PROCEDURE — 71045 X-RAY EXAM CHEST 1 VIEW: CPT

## 2017-06-02 PROCEDURE — 94640 AIRWAY INHALATION TREATMENT: CPT

## 2017-06-02 PROCEDURE — 81003 URINALYSIS AUTO W/O SCOPE: CPT

## 2017-06-02 PROCEDURE — 80053 COMPREHEN METABOLIC PANEL: CPT

## 2017-06-02 PROCEDURE — 83735 ASSAY OF MAGNESIUM: CPT

## 2017-06-02 PROCEDURE — 87040 BLOOD CULTURE FOR BACTERIA: CPT

## 2017-06-02 PROCEDURE — 87186 SC STD MICRODIL/AGAR DIL: CPT

## 2017-06-02 PROCEDURE — 99232 SBSQ HOSP IP/OBS MODERATE 35: CPT

## 2017-06-02 PROCEDURE — 93306 TTE W/DOPPLER COMPLETE: CPT

## 2017-06-02 PROCEDURE — 85610 PROTHROMBIN TIME: CPT

## 2017-06-02 PROCEDURE — 83605 ASSAY OF LACTIC ACID: CPT

## 2017-06-02 PROCEDURE — 99285 EMERGENCY DEPT VISIT HI MDM: CPT

## 2017-06-02 PROCEDURE — 76775 US EXAM ABDO BACK WALL LIM: CPT

## 2017-06-02 PROCEDURE — 70450 CT HEAD/BRAIN W/O DYE: CPT

## 2017-06-02 PROCEDURE — 84443 ASSAY THYROID STIM HORMONE: CPT

## 2017-06-02 PROCEDURE — 97116 GAIT TRAINING THERAPY: CPT

## 2017-06-02 PROCEDURE — 82607 VITAMIN B-12: CPT

## 2017-06-02 PROCEDURE — 84484 ASSAY OF TROPONIN QUANT: CPT

## 2017-06-02 PROCEDURE — 80048 BASIC METABOLIC PNL TOTAL CA: CPT

## 2017-06-02 PROCEDURE — 99238 HOSP IP/OBS DSCHRG MGMT 30/<: CPT

## 2017-06-02 PROCEDURE — 85027 COMPLETE CBC AUTOMATED: CPT

## 2017-06-02 PROCEDURE — 97162 PT EVAL MOD COMPLEX 30 MIN: CPT

## 2017-06-02 PROCEDURE — 87086 URINE CULTURE/COLONY COUNT: CPT

## 2017-06-02 PROCEDURE — 82746 ASSAY OF FOLIC ACID SERUM: CPT

## 2017-06-02 PROCEDURE — 82550 ASSAY OF CK (CPK): CPT

## 2017-06-02 PROCEDURE — 86850 RBC ANTIBODY SCREEN: CPT

## 2017-06-02 PROCEDURE — 83036 HEMOGLOBIN GLYCOSYLATED A1C: CPT

## 2017-06-02 PROCEDURE — 85730 THROMBOPLASTIN TIME PARTIAL: CPT

## 2017-06-02 PROCEDURE — 86900 BLOOD TYPING SEROLOGIC ABO: CPT

## 2017-06-02 PROCEDURE — 93005 ELECTROCARDIOGRAM TRACING: CPT

## 2017-06-02 PROCEDURE — 86901 BLOOD TYPING SEROLOGIC RH(D): CPT

## 2017-06-02 RX ORDER — FLUTICASONE PROPIONATE 50 MCG
1 SPRAY, SUSPENSION NASAL
Qty: 0 | Refills: 0 | COMMUNITY
Start: 2017-06-02

## 2017-06-02 RX ORDER — LISINOPRIL 2.5 MG/1
1 TABLET ORAL
Qty: 30 | Refills: 0 | OUTPATIENT
Start: 2017-06-02 | End: 2017-07-02

## 2017-06-02 RX ADMIN — Medication 1 MILLIGRAM(S): at 11:34

## 2017-06-02 RX ADMIN — Medication 12.5 MILLIGRAM(S): at 06:03

## 2017-06-02 RX ADMIN — SERTRALINE 50 MILLIGRAM(S): 25 TABLET, FILM COATED ORAL at 11:34

## 2017-06-02 RX ADMIN — HEPARIN SODIUM 5000 UNIT(S): 5000 INJECTION INTRAVENOUS; SUBCUTANEOUS at 14:55

## 2017-06-02 RX ADMIN — CHOLESTYRAMINE 4 GRAM(S): 4 POWDER, FOR SUSPENSION ORAL at 08:45

## 2017-06-02 RX ADMIN — HEPARIN SODIUM 5000 UNIT(S): 5000 INJECTION INTRAVENOUS; SUBCUTANEOUS at 06:04

## 2017-06-02 RX ADMIN — Medication 1 TABLET(S): at 06:03

## 2017-06-02 RX ADMIN — Medication 5 MILLIGRAM(S): at 11:34

## 2017-06-02 RX ADMIN — Medication 200 MILLIGRAM(S): at 11:34

## 2017-06-02 RX ADMIN — MODAFINIL 200 MILLIGRAM(S): 200 TABLET ORAL at 06:03

## 2017-06-02 RX ADMIN — Medication 1 SPRAY(S): at 11:34

## 2017-06-02 RX ADMIN — Medication 75 MICROGRAM(S): at 06:03

## 2017-06-02 RX ADMIN — PANTOPRAZOLE SODIUM 40 MILLIGRAM(S): 20 TABLET, DELAYED RELEASE ORAL at 06:03

## 2017-06-02 NOTE — PROGRESS NOTE ADULT - SUBJECTIVE AND OBJECTIVE BOX
Consult Note:   · Provider Specialty	Cardiology	    Referral/Consultation:   Initial Consult:  · Requested by Name:	Dr Blunt	  · Date/Time:	01-Jun-2017 12:16	  · Reason for Referral/Consultation:	syncope abnormal EKG ,	      · Subjective and Objective: 	  PCP:    REQUESTING PHYSICIAN:    REASON FOR CONSULT:    CHIEF COMPLAINT:    HPI:89 year old female with hypertension ,DM  HLD ,advanced dementia , who was noted to unresponsive ,lethargic , but was breathing after her meal , patient  blood pressure noted 140/80 ,heart rate was 74  saturation was 99% patient blood sugar noted in ER was in 74 , Patient was admitted to telemetry ,her blood pressure is mild elevated , no evidence of hypotension , cardiac rhythm was sinus with episodes of PVS , couplets , patient hydrated , renal function improving , Patient can not give details ,      06/02/17 - Denies chest pain, SOB, dizziness. Very confused.         PAST MEDICAL & SURGICAL HISTORY:  High cholesterol  Diabetes  Hypothyroid  Hypertension  Dementia  Sleep apnea  Esophageal ulceration  Hypertension  Hyperlipidemia  Hypothyroid  Diabetes mellitus  Status post hip surgery  H/O kyphoplasty  No significant past surgical history  No significant past surgical history      Allergies    Tagamet (Unknown)      SOCIAL HISTORY: non smoker.    FAMILY HISTORY:  No pertinent family history in first degree relatives      MEDICATIONS:    MEDICATIONS  (STANDING):  sertraline 50milliGRAM(s) Oral daily  cholestyramine Powder (Sugar-Free) 4Gram(s) Oral daily  metoprolol 12.5milliGRAM(s) Oral two times a day  modafinil 200milliGRAM(s) Oral before breakfast  docusate sodium 200milliGRAM(s) Oral daily  fluticasone propionate 50 MICROgram(s)/spray Nasal Spray 1Spray(s) Both Nostrils daily  pantoprazole    Tablet 40milliGRAM(s) Oral before breakfast  levothyroxine 75MICROGram(s) Oral daily  oxybutynin 5milliGRAM(s) Oral daily  calcium carbonate  625 mG + Vitamin D (OsCal 250 + D) 1Tablet(s) Oral two times a day  folic acid 1milliGRAM(s) Oral daily  heparin  Injectable 5000Unit(s) SubCutaneous every 8 hours  insulin lispro (HumaLOG) corrective regimen sliding scale  SubCutaneous three times a day before meals  dextrose 5%. 1000milliLiter(s) IV Continuous <Continuous>  dextrose 50% Injectable 12.5Gram(s) IV Push once  dextrose 50% Injectable 25Gram(s) IV Push once  dextrose 50% Injectable 25Gram(s) IV Push once    MEDICATIONS  (PRN):  ondansetron Injectable 4milliGRAM(s) IV Push every 8 hours PRN Nausea and/or Vomiting  dextrose Gel 1Dose(s) Oral once PRN Blood Glucose LESS THAN 70 milliGRAM(s)/deciliter  glucagon  Injectable 1milliGRAM(s) IntraMuscular once PRN Glucose LESS THAN 70 milligrams/deciliter      REVIEW OF SYSTEMS:    Pt is confused. Not able to obtain.       Vital Signs Last 24 Hrs  T(C): 36.8, Max: 36.9 (06-01 @ 09:30)  T(F): 98.3, Max: 98.5 (06-01 @ 09:30)  HR: 63 (63 - 89)  BP: 172/73 (140/68 - 172/73)  BP(mean): --  RR: 16 (16 - 18)  SpO2: 97% (95% - 98%)    I&O's Summary    I & Os for current day (as of 02 Jun 2017 08:14)  =============================================  IN: 500 ml / OUT: 0 ml / NET: 500 ml      PHYSICAL EXAM:    Constitutional: NAD, awake and alert, well-developed  HEENT: NC/AT. Anicteric.  No oral cyanosis.  Neck:   No JVD  Respiratory: Breath sounds are clear bilaterally, No wheezing, rales or rhonchi  Cardiovascular: S1 and S2, regular rhythm, no Murmurs, gallops or rubs  Gastrointestinal: Bowel Sounds present, soft, nontender.   Extremities: No peripheral edema. No clubbing or cyanosis.  Neurological: Awake and alert. Confused.   Musculoskeletal: no calf tenderness.  Skin: No rashes.      LABS: All Labs Reviewed:                        10.9   4.7   )-----------( 212      ( 01 Jun 2017 07:37 )             31.5                         9.7    5.0   )-----------( 213      ( 31 May 2017 14:34 )             30.3                         9.8    6.4   )-----------( 216      ( 30 May 2017 19:51 )             29.7     01 Jun 2017 07:37    141    |  106    |  22     ----------------------------<  94     4.3     |  29     |  1.50   31 May 2017 07:49    141    |  107    |  24     ----------------------------<  114    5.3     |  24     |  1.42   30 May 2017 23:04    140    |  106    |  26     ----------------------------<  72     4.6     |  29     |  1.40     Ca    8.9        01 Jun 2017 07:37  Ca    8.7        31 May 2017 07:49  Ca    8.6        30 May 2017 23:04  Mg     2.1       30 May 2017 19:50    TPro  7.5    /  Alb  3.6    /  TBili  0.2    /  DBili  x      /  AST  16     /  ALT  15     /  AlkPhos  85     30 May 2017 19:50      06-01 @ 12:22  TSH: 10.62      RADIOLOGY/EKG:      EXAM:  CHEST-PORTABLE URGENT                          PROCEDURE DATE:  05/30/2017        INTERPRETATION:  History: Syncope.    AP chest. Prior 8/5/2016.    Heart magnified by AP film shallow inspiration. Atherosclerotic aorta. No   pleural-parenchymal abnormality. Status post kyphoplasty.    Impression: No active disease. Stable exam      KENNEDY HUNT M.D., ATTENDING RADIOLOGThis document has been electronically signed. May 31 2017  8:33AM        Conclusion:  1. 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  2. Left atrium at the upper for normal with associated mild mitral   regurgitation.  3. Trace aortic insufficiency.  4. Mild tricuspid insufficiency with a pulmonary artery systolic pressure   of about 38 mm of [place].   5. Evidence for some mild left ventricular diastolic dysfunction. Nl.LV systolic function.   6. Correlate clinically.      PONCE CHINCHILLA M.D., ATTENDING CARDIOLOGISTThis document has been electronically signed. Jun1 2017  2:00PM    rhythm - sinus ; occasional pvcs.         Assessment and Recommendation:   Problem/Recommendation - 1:  Problem: Syncope, unspecified syncope type. Recommendation: Unclear , possible hypoglycemia? dehydration ,,less likely  arrhythmia related , encourage po fluid intake , avoid hypoglycemic. No malignant arrhythmia, significant bradycardia or pauses noted. No orthgstatic BP changes documented.     Problem/Recommendation - 2:  ·  Problem: Hypertension.  Recommendation: BP elevated,  continue  low dose lopressor   She was receiving Lisinopril on admission. Consider re-institution at lower dose ( renal insufficiency) and monitor electrolytes and renal function.     Problem/Recommendation - 3:  ·  Problem: Abnormal EKG.  Recommendation: PVCS  without ventricular tachycardia. Has nl. LVEF,   without chest pain. Repeat EKG without significant change , echocardiogram demonstrates nl LVEF.Is receiving beta blocker.     Problem/Recommendation - 4:  ·  Problem: Dementia.  Recommendation: advanced .     Problem/ -  Elevated TSH - as per primary service.
HPI:  89 y/o F pt with hx of HTN, HLD, Diabetes, Dementia presents to ED BIBA from nursing home for a period of unresponsiveness per EMS. Per EMS pt had been weak and not acting herself. Hx is limited secondary to patient's dementia. No events overnight. No notable events on telemetry monitoring. Mental status improved per family at bedside. No further episodes of LOC.     REVIEW OF SYSTEMS:    unable to obtain 2/2 dementia    VITAL SIGNS:  Vital Signs Last 24 Hrs  T(C): 37.1, Max: 37.1 (05-31 @ 10:00)  T(F): 98.7, Max: 98.7 (05-31 @ 10:00)  HR: 76 (66 - 88)  BP: 142/62 (125/57 - 153/80)  BP(mean): --  RR: 16 (16 - 19)  SpO2: 96% (95% - 100%)      PHYSICAL EXAM:     GENERAL: no acute distress  HEENT: NC/AT, EOMI, neck supple, MMM  RESPIRATORY: LCTAB/L, no rhonchi, rales, or wheezing  CARDIOVASCULAR: RRR, no murmurs, gallops, rubs  ABDOMINAL: soft, non-tender, non-distended, positive bowel sounds   EXTREMITIES: no clubbing, cyanosis, or edema  NEUROLOGICAL: alert and oriented x 3, non-focal  SKIN: no rashes or lesions   MUSCULOSKELETAL: no gross joint deformity                          9.8    6.4   )-----------( 216      ( 30 May 2017 19:51 )             29.7     05-31    141  |  107  |  24<H>  ----------------------------<  114<H>  5.3   |  24  |  1.42<H>    Ca    8.7      31 May 2017 07:49  Mg     2.1     05-30    TPro  7.5  /  Alb  3.6  /  TBili  0.2  /  DBili  x   /  AST  16  /  ALT  15  /  AlkPhos  85  05-30      CAPILLARY BLOOD GLUCOSE  118 (31 May 2017 08:03)  148 (30 May 2017 19:16)      MEDICATIONS  (STANDING):  lisinopril 20milliGRAM(s) Oral daily  sertraline 50milliGRAM(s) Oral daily  cholestyramine Powder (Sugar-Free) 4Gram(s) Oral daily  metoprolol 12.5milliGRAM(s) Oral two times a day  donepezil 10milliGRAM(s) Oral at bedtime  modafinil 200milliGRAM(s) Oral before breakfast  docusate sodium 200milliGRAM(s) Oral daily  fluticasone propionate 50 MICROgram(s)/spray Nasal Spray 1Spray(s) Both Nostrils daily  pantoprazole    Tablet 40milliGRAM(s) Oral before breakfast  levothyroxine 75MICROGram(s) Oral daily  oxybutynin 5milliGRAM(s) Oral daily  calcium carbonate  625 mG + Vitamin D (OsCal 250 + D) 1Tablet(s) Oral two times a day  folic acid 1milliGRAM(s) Oral daily  heparin  Injectable 5000Unit(s) SubCutaneous every 8 hours  insulin lispro (HumaLOG) corrective regimen sliding scale  SubCutaneous three times a day before meals  dextrose 5%. 1000milliLiter(s) IV Continuous <Continuous>  dextrose 50% Injectable 12.5Gram(s) IV Push once  dextrose 50% Injectable 25Gram(s) IV Push once  dextrose 50% Injectable 25Gram(s) IV Push once  sodium chloride 0.9%. 1000milliLiter(s) IV Continuous <Continuous>
Neurology follow up note    LEONELA [id number]      Interval History:    Patient feels ok no new complaints.    MEDICATIONS    ondansetron Injectable 4milliGRAM(s) IV Push every 8 hours PRN  sertraline 50milliGRAM(s) Oral daily  cholestyramine Powder (Sugar-Free) 4Gram(s) Oral daily  metoprolol 12.5milliGRAM(s) Oral two times a day  modafinil 200milliGRAM(s) Oral before breakfast  docusate sodium 200milliGRAM(s) Oral daily  fluticasone propionate 50 MICROgram(s)/spray Nasal Spray 1Spray(s) Both Nostrils daily  pantoprazole    Tablet 40milliGRAM(s) Oral before breakfast  levothyroxine 75MICROGram(s) Oral daily  oxybutynin 5milliGRAM(s) Oral daily  calcium carbonate  625 mG + Vitamin D (OsCal 250 + D) 1Tablet(s) Oral two times a day  folic acid 1milliGRAM(s) Oral daily  heparin  Injectable 5000Unit(s) SubCutaneous every 8 hours  insulin lispro (HumaLOG) corrective regimen sliding scale  SubCutaneous three times a day before meals  dextrose 5%. 1000milliLiter(s) IV Continuous <Continuous>  dextrose Gel 1Dose(s) Oral once PRN  dextrose 50% Injectable 12.5Gram(s) IV Push once  dextrose 50% Injectable 25Gram(s) IV Push once  dextrose 50% Injectable 25Gram(s) IV Push once  glucagon  Injectable 1milliGRAM(s) IntraMuscular once PRN      Allergies    Tagamet (Unknown)    Intolerances            Vital Signs Last 24 Hrs  T(C): 36.7, Max: 36.9 (06-01 @ 13:30)  T(F): 98, Max: 98.5 (06-01 @ 17:12)  HR: 64 (63 - 89)  BP: 128/71 (128/71 - 172/73)  BP(mean): --  RR: 16 (16 - 18)  SpO2: 99% (95% - 99%)        REVIEW OF SYSTEMS: Non Verbal  Limit or unable to obtain secondary to patient's poor mental status.    Constitutional: No fever, chills, fatigue, weakness  Eyes: no eye pain, visual disturbances, or discharge  ENT:  No difficulty hearing, tinnitus, vertigo; No sinus or throat pain  Neck: No pain or stiffness  Respiratory: No cough, dyspnea, wheezing   Cardiovascular: No chest pain, palpitations,   Gastrointestinal: No abdominal or epigastric pain. No nausea, vomiting  No diarrhea or constipation.   Genitourinary: No dysuria, frequency, hematuria or incontinence  Neurological: No headaches, lightheadedness, vertigo, numbness or tremors  Psychiatric: No depression, anxiety, mood swings or difficulty sleeping  Musculoskeletal: No joint pain or swelling; No muscle, back or extremity pain  Skin: No itching, burning, rashes or lesions   Lymph Nodes: No enlarged glands  Endocrine: No heat or cold intolerance; No hair loss   Allergy and Immunologic: No hives or eczema    On Neurological Examination:    Mental Status - Patient is alert, awake, Dementia        Follow simple commands      Speech -   Fluent                      Cranial Nerves - Pupils 3 mm equal and reactive to light,   extraocular eye movements intact.   smile symmetric  intact bilateral NLF    Motor Exam -    positive movement of all 4 extremities    Muscle tone - is normal all over.  No asymmetry is seen.      Sensory    Bilateral intact to light touch      GENERAL Exam: Nontoxic , No Acute Distress   	  HEENT:  normocephalic, atraumatic  		  LUNGS: Clear bilaterally    	  HEART: Normal S1S2   No murmur RRR        	  GI/ ABDOMEN:  Soft  Non tender    EXTREMITIES:   No Edema  No Clubbing  No Cyanosis No Edema    MUSCULOSKELETAL: Normal Range of Motion  	   SKIN: Normal  No Ecchymosis               LABS:  CBC Full  -  ( 01 Jun 2017 07:37 )  WBC Count : 4.7 K/uL  Hemoglobin : 10.9 g/dL  Hematocrit : 31.5 %  Platelet Count - Automated : 212 K/uL  Mean Cell Volume : 89.0 fl  Mean Cell Hemoglobin : 30.7 pg  Mean Cell Hemoglobin Concentration : 34.5 gm/dL  Auto Neutrophil # : x  Auto Lymphocyte # : x  Auto Monocyte # : x  Auto Eosinophil # : x  Auto Basophil # : x  Auto Neutrophil % : x  Auto Lymphocyte % : x  Auto Monocyte % : x  Auto Eosinophil % : x  Auto Basophil % : x      06-01    141  |  106  |  22  ----------------------------<  94  4.3   |  29  |  1.50<H>    Ca    8.9      01 Jun 2017 07:37      Hemoglobin A1C:       Vitamin B12         RADIOLOGY      ANALYSIS AND PLAN:  A 89-year-old with episode of unresponsiveness, history of change in mental status and dementia.  1.	For episode of unresponsiveness, the patient does have a history of narcolepsy, questionable this could have been a narcolepsy attack, questionable this was a syncopal event with prolonged cerebral hypoperfusion.  2.	I would recommend telemetry evaluation to rule out any type of underlying arrhythmia.  3.	For history of narcolepsy, I would recommend to continue the patient on home modafinil.  4.	For history of dementia, continue the patient on her Aricept.  5.	For changes in mental changes most likely becoming worse in the hospital setting, the patient does appear to have sundown from my conversation with the patient's son, Shashank.  6.	I would recommend Physical Therapy evaluation.  7.	I would recommend fall precautions.  8.	I spoke with son, Shashank, at [phone]  in past   9.	no new events   10.	Neurology stable .    Thank you for the courtesy of this consultation.    Physical therapy evaluation.  OOB to chair/ambulation with assistance only.  Advanced care planning was discussed with family.  Pain is accessed and addressed.  Pt was screened for signs of clinical depression. Appropriate referral made.  Risk of falls accessed. Fall prevention and plan of care was discussed with family.  Pt is screened for tobacco and alcohol use. Counseling was done for smoking and alcohol cessation.  Use of narcotic pain meds was discussed Pt is advised to use narcotic meds wisely and to refrain from over using them.  Plan of care was discussed with family. Questions answered yesterday .  Would continue to follow.  25 minutes spent on total encounter; more than 50% of the visit was spent counseling and/or coordinating care by the attending physician.
Neurology follow up note    LEONELA [id number]      Interval History:    Patient feels ok no new complaints.    MEDICATIONS    ondansetron Injectable 4milliGRAM(s) IV Push every 8 hours PRN  sertraline 50milliGRAM(s) Oral daily  cholestyramine Powder (Sugar-Free) 4Gram(s) Oral daily  metoprolol 12.5milliGRAM(s) Oral two times a day  modafinil 200milliGRAM(s) Oral before breakfast  docusate sodium 200milliGRAM(s) Oral daily  fluticasone propionate 50 MICROgram(s)/spray Nasal Spray 1Spray(s) Both Nostrils daily  pantoprazole    Tablet 40milliGRAM(s) Oral before breakfast  levothyroxine 75MICROGram(s) Oral daily  oxybutynin 5milliGRAM(s) Oral daily  calcium carbonate  625 mG + Vitamin D (OsCal 250 + D) 1Tablet(s) Oral two times a day  folic acid 1milliGRAM(s) Oral daily  heparin  Injectable 5000Unit(s) SubCutaneous every 8 hours  insulin lispro (HumaLOG) corrective regimen sliding scale  SubCutaneous three times a day before meals  dextrose 5%. 1000milliLiter(s) IV Continuous <Continuous>  dextrose Gel 1Dose(s) Oral once PRN  dextrose 50% Injectable 12.5Gram(s) IV Push once  dextrose 50% Injectable 25Gram(s) IV Push once  dextrose 50% Injectable 25Gram(s) IV Push once  glucagon  Injectable 1milliGRAM(s) IntraMuscular once PRN      Allergies    Tagamet (Unknown)    Intolerances            Vital Signs Last 24 Hrs  T(C): 37.1, Max: 37.1 (05-31 @ 10:00)  T(F): 98.7, Max: 98.7 (05-31 @ 10:00)  HR: 60 (60 - 76)  BP: 162/68 (142/62 - 175/74)  BP(mean): --  RR: 17 (16 - 18)  SpO2: 94% (94% - 97%)      REVIEW OF SYSTEMS: Non Verbal  Limit or unable to obtain secondary to patient's poor mental status.    Constitutional: No fever, chills, fatigue, weakness  Eyes: no eye pain, visual disturbances, or discharge  ENT:  No difficulty hearing, tinnitus, vertigo; No sinus or throat pain  Neck: No pain or stiffness  Respiratory: No cough, dyspnea, wheezing   Cardiovascular: No chest pain, palpitations,   Gastrointestinal: No abdominal or epigastric pain. No nausea, vomiting  No diarrhea or constipation.   Genitourinary: No dysuria, frequency, hematuria or incontinence  Neurological: No headaches, lightheadedness, vertigo, numbness or tremors  Psychiatric: No depression, anxiety, mood swings or difficulty sleeping  Musculoskeletal: No joint pain or swelling; No muscle, back or extremity pain  Skin: No itching, burning, rashes or lesions   Lymph Nodes: No enlarged glands  Endocrine: No heat or cold intolerance; No hair loss   Allergy and Immunologic: No hives or eczema    On Neurological Examination:    Mental Status - Patient is alert, awake, Dementia        Follow simple commands      Speech -   Fluent                      Cranial Nerves - Pupils 3 mm equal and reactive to light,   extraocular eye movements intact.   smile symmetric  intact bilateral NLF    Motor Exam -    positive movement of all 4 extremities    Muscle tone - is normal all over.  No asymmetry is seen.      Sensory    Bilateral intact to light touch      GENERAL Exam: Nontoxic , No Acute Distress   	  HEENT:  normocephalic, atraumatic  		  LUNGS: Clear bilaterally    	  HEART: Normal S1S2   No murmur RRR        	  GI/ ABDOMEN:  Soft  Non tender    EXTREMITIES:   No Edema  No Clubbing  No Cyanosis No Edema    MUSCULOSKELETAL: Normal Range of Motion  	   SKIN: Normal  No Ecchymosis               LABS:  CBC Full  -  ( 2017 07:37 )  WBC Count : 4.7 K/uL  Hemoglobin : 10.9 g/dL  Hematocrit : 31.5 %  Platelet Count - Automated : 212 K/uL  Mean Cell Volume : 89.0 fl  Mean Cell Hemoglobin : 30.7 pg  Mean Cell Hemoglobin Concentration : 34.5 gm/dL  Auto Neutrophil # : x  Auto Lymphocyte # : x  Auto Monocyte # : x  Auto Eosinophil # : x  Auto Basophil # : x  Auto Neutrophil % : x  Auto Lymphocyte % : x  Auto Monocyte % : x  Auto Eosinophil % : x  Auto Basophil % : x    Urinalysis Basic - ( 30 May 2017 20:50 )    Color: Yellow / Appearance: Clear / S.005 / pH: x  Gluc: x / Ketone: Negative  / Bili: Negative / Urobili: Negative mg/dL   Blood: x / Protein: Negative mg/dL / Nitrite: Negative   Leuk Esterase: Negative / RBC: x / WBC x   Sq Epi: x / Non Sq Epi: x / Bacteria: x          141  |  106  |  22  ----------------------------<  94  4.3   |  29  |  1.50<H>    Ca    8.9      2017 07:37  Mg     2.1         TPro  7.5  /  Alb  3.6  /  TBili  0.2  /  DBili  x   /  AST  16  /  ALT  15  /  AlkPhos  85      Hemoglobin A1C: Hemoglobin A1C, Whole Blood: 6.0 % ( @ 11:19)      LIVER FUNCTIONS - ( 30 May 2017 19:50 )  Alb: 3.6 g/dL / Pro: 7.5 g/dL / ALK PHOS: 85 U/L / ALT: 15 U/L DA / AST: 16 U/L / GGT: x           Vitamin B12   PT/INR - ( 30 May 2017 19:51 )   PT: 10.8 sec;   INR: 0.99 ratio         PTT - ( 30 May 2017 19:51 )  PTT:32.1 sec      RADIOLOGY    ANALYSIS AND PLAN:  A 89-year-old with episode of unresponsiveness, history of change in mental status and dementia.  1.	For episode of unresponsiveness, the patient does have a history of narcolepsy, questionable this could have been a narcolepsy attack, questionable this was a syncopal event with prolonged cerebral hypoperfusion.  2.	I would recommend telemetry evaluation to rule out any type of underlying arrhythmia.  3.	For history of narcolepsy, I would recommend to continue the patient on home modafinil.  4.	For history of dementia, continue the patient on her Aricept.  5.	For changes in mental changes most likely becoming worse in the hospital setting, the patient does appear to have sundown from my conversation with the patient's son, Shashank.  6.	I would recommend Physical Therapy evaluation.  7.	I would recommend fall precautions.  8.	I spoke with son, Shashank, at [phone] left message today   9.	Neurology stable .    Thank you for the courtesy of this consultation.    Physical therapy evaluation.  OOB to chair/ambulation with assistance only.  Advanced care planning was discussed with family.  Pain is accessed and addressed.  Pt was screened for signs of clinical depression. Appropriate referral made.  Risk of falls accessed. Fall prevention and plan of care was discussed with family.  Pt is screened for tobacco and alcohol use. Counseling was done for smoking and alcohol cessation.  Use of narcotic pain meds was discussed Pt is advised to use narcotic meds wisely and to refrain from over using them.  Plan of care was discussed with family. Questions answered yesterday .  Would continue to follow.  30 minutes spent on total encounter; more than 50% of the visit was spent counseling and/or coordinating care by the attending physician.
Patient is a 89y old  Female who presents with a chief complaint of 89 y/o F pt with hx of HTN, HLD, Diabetes, Dementia presents to ED BIBA from nursing home for a period of unresponsiveness per EMS. Per EMS pt has been weak and not acting herself. Hx is limited from pt secondary to medical hx of Dementia. (31 May 2017 17:44)      INTERVAL HPI/OVERNIGHT EVENTS: syncope of unknown etiology. history limited due to dementia.  son Shashank at bedside - requesting mother to stay one more day.  she is improved overall but he feels staying another night would help fully tune her up for [facility] tomorrow.    echo ordered.    tele: many PVCs    cardio consulted    MEDICATIONS  (STANDING):  sertraline 50milliGRAM(s) Oral daily  cholestyramine Powder (Sugar-Free) 4Gram(s) Oral daily  metoprolol 12.5milliGRAM(s) Oral two times a day  modafinil 200milliGRAM(s) Oral before breakfast  docusate sodium 200milliGRAM(s) Oral daily  fluticasone propionate 50 MICROgram(s)/spray Nasal Spray 1Spray(s) Both Nostrils daily  pantoprazole    Tablet 40milliGRAM(s) Oral before breakfast  levothyroxine 75MICROGram(s) Oral daily  oxybutynin 5milliGRAM(s) Oral daily  calcium carbonate  625 mG + Vitamin D (OsCal 250 + D) 1Tablet(s) Oral two times a day  folic acid 1milliGRAM(s) Oral daily  heparin  Injectable 5000Unit(s) SubCutaneous every 8 hours  insulin lispro (HumaLOG) corrective regimen sliding scale  SubCutaneous three times a day before meals  dextrose 5%. 1000milliLiter(s) IV Continuous <Continuous>  dextrose 50% Injectable 12.5Gram(s) IV Push once  dextrose 50% Injectable 25Gram(s) IV Push once  dextrose 50% Injectable 25Gram(s) IV Push once    MEDICATIONS  (PRN):  ondansetron Injectable 4milliGRAM(s) IV Push every 8 hours PRN Nausea and/or Vomiting  dextrose Gel 1Dose(s) Oral once PRN Blood Glucose LESS THAN 70 milliGRAM(s)/deciliter  glucagon  Injectable 1milliGRAM(s) IntraMuscular once PRN Glucose LESS THAN 70 milligrams/deciliter      Allergies    Tagamet (Unknown)    Intolerances        REVIEW OF SYSTEMS:  Negative unless otherwise specified above.    Vital Signs Last 24 Hrs  T(C): 36.9, Max: 37.1 ( @ 17:20)  T(F): 98.5, Max: 98.7 ( @ 17:20)  HR: 70 (60 - 71)  BP: 151/66 (151/66 - 175/74)  BP(mean): --  RR: 17 (16 - 18)  SpO2: 95% (94% - 97%)        PHYSICAL EXAM:  NAD  lungs clear  RRR  abd soft non-tender  no edema  dementia - oriented to self only, knows son name  non-focal    LABS:                        10.9   4.7   )-----------( 212      ( 2017 07:37 )             31.5     2017 07:37    141    |  106    |  22     ----------------------------<  94     4.3     |  29     |  1.50     Ca    8.9        2017 07:37      PT/INR - ( 30 May 2017 19:51 )   PT: 10.8 sec;   INR: 0.99 ratio         PTT - ( 30 May 2017 19:51 )  PTT:32.1 sec  Urinalysis Basic - ( 30 May 2017 20:50 )    Color: Yellow / Appearance: Clear / S.005 / pH: x  Gluc: x / Ketone: Negative  / Bili: Negative / Urobili: Negative mg/dL   Blood: x / Protein: Negative mg/dL / Nitrite: Negative   Leuk Esterase: Negative / RBC: x / WBC x   Sq Epi: x / Non Sq Epi: x / Bacteria: x      CAPILLARY BLOOD GLUCOSE  127 (2017 12:05)  89 (2017 07:58)  117 (31 May 2017 21:12)  103 (31 May 2017 17:20)      RADIOLOGY & ADDITIONAL TESTS:    I & Os for 24h ending  @ 07:00  =============================================  IN:    sodium chloride 0.9%: 500 ml    Oral Fluid: 120 ml    Total IN: 620 ml  ---------------------------------------------  OUT:    Total OUT: 0 ml  ---------------------------------------------  Total NET: 620 ml    I & Os for current day (as of  @ 12:57)  =============================================  IN:    Oral Fluid: 360 ml    Total IN: 360 ml  ---------------------------------------------  OUT:    Total OUT: 0 ml  ---------------------------------------------  Total NET: 360 ml
Patient is a 89y old  Female who presents with a chief complaint of 89 y/o F pt with hx of HTN, HLD, Diabetes, Dementia presents to ED BIBA from nursing home for a period of unresponsiveness per EMS. (31 May 2017 17:44)      INTERVAL HPI/OVERNIGHT EVENTS:    MEDICATIONS  (STANDING):  sertraline 50milliGRAM(s) Oral daily  cholestyramine Powder (Sugar-Free) 4Gram(s) Oral daily  metoprolol 12.5milliGRAM(s) Oral two times a day  modafinil 200milliGRAM(s) Oral before breakfast  docusate sodium 200milliGRAM(s) Oral daily  fluticasone propionate 50 MICROgram(s)/spray Nasal Spray 1Spray(s) Both Nostrils daily  pantoprazole    Tablet 40milliGRAM(s) Oral before breakfast  levothyroxine 75MICROGram(s) Oral daily  oxybutynin 5milliGRAM(s) Oral daily  calcium carbonate  625 mG + Vitamin D (OsCal 250 + D) 1Tablet(s) Oral two times a day  folic acid 1milliGRAM(s) Oral daily  heparin  Injectable 5000Unit(s) SubCutaneous every 8 hours  insulin lispro (HumaLOG) corrective regimen sliding scale  SubCutaneous three times a day before meals  dextrose 5%. 1000milliLiter(s) IV Continuous <Continuous>  dextrose 50% Injectable 12.5Gram(s) IV Push once  dextrose 50% Injectable 25Gram(s) IV Push once  dextrose 50% Injectable 25Gram(s) IV Push once    MEDICATIONS  (PRN):  ondansetron Injectable 4milliGRAM(s) IV Push every 8 hours PRN Nausea and/or Vomiting  dextrose Gel 1Dose(s) Oral once PRN Blood Glucose LESS THAN 70 milliGRAM(s)/deciliter  glucagon  Injectable 1milliGRAM(s) IntraMuscular once PRN Glucose LESS THAN 70 milligrams/deciliter      Allergies    Tagamet (Unknown)    Intolerances        REVIEW OF SYSTEMS:  CONSTITUTIONAL: No fever, weight loss, or fatigue  EYES: No eye pain, visual disturbances, or discharge  ENMT:  No difficulty hearing, tinnitus, vertigo; No sinus or throat pain  NECK: No pain or stiffness  BREASTS: No pain, masses, or nipple discharge  RESPIRATORY: No cough, wheezing, chills or hemoptysis; No shortness of breath  CARDIOVASCULAR: No chest pain, palpitations, dizziness, or leg swelling  GASTROINTESTINAL: No abdominal or epigastric pain. No nausea, vomiting, or hematemesis; No diarrhea or constipation. No melena or hematochezia.  GENITOURINARY: No dysuria, frequency, hematuria, or incontinence  NEUROLOGICAL: No headaches, memory loss, loss of strength, numbness, or tremors  SKIN: No itching, burning, rashes, or lesions   LYMPH NODES: No enlarged glands  ENDOCRINE: No heat or cold intolerance; No hair loss; No polydipsia or polyuria  MUSCULOSKELETAL: No joint pain or swelling; No muscle, back, or extremity pain  PSYCHIATRIC: No depression, anxiety, mood swings, or difficulty sleeping  HEME/LYMPH: No easy bruising, or bleeding gums  ALLERGY AND IMMUNOLOGIC: No hives or eczema    Vital Signs Last 24 Hrs  T(C): 36.7, Max: 36.9 (06-01 @ 13:30)  T(F): 98, Max: 98.5 (06-01 @ 17:12)  HR: 64 (63 - 89)  BP: 128/71 (128/71 - 172/73)  BP(mean): --  RR: 16 (16 - 18)  SpO2: 99% (95% - 99%)    PHYSICAL EXAM:  GENERAL: NAD, well-groomed, well-developed  HEAD:  Atraumatic, Normocephalic  EYES: EOMI, PERRLA, conjunctiva and sclera clear  ENMT: No tonsillar erythema, exudates, or enlargement; Moist mucous membranes, Good dentition, No lesions  NECK: Supple, No JVD, Normal thyroid  NERVOUS SYSTEM:  Alert & Oriented X3, Good concentration; Motor Strength 5/5 B/L upper and lower extremities; DTRs 2+ intact and symmetric  CHEST/LUNG: Clear to auscultation bilaterally; No rales, rhonchi, wheezing, or rubs  HEART: Regular rate and rhythm; No murmurs, rubs, or gallops  ABDOMEN: Soft, Nontender, Nondistended; Bowel sounds present  EXTREMITIES:  2+ Peripheral Pulses, No clubbing, cyanosis, or edema  LYMPH: No lymphadenopathy noted  SKIN: No rashes or lesions    LABS:      Ca    8.9        01 Jun 2017 07:37          CAPILLARY BLOOD GLUCOSE  98 (02 Jun 2017 07:59)  107 (01 Jun 2017 21:20)  139 (01 Jun 2017 16:37)  127 (01 Jun 2017 12:05)      RADIOLOGY & ADDITIONAL TESTS:    Imaging Personally Reviewed:  [ ] YES     Consultant(s) Notes Reviewed:      Care Discussed with Consultants/Other Providers:

## 2017-06-02 NOTE — PROGRESS NOTE ADULT - PROBLEM SELECTOR PROBLEM 1
Syncope, unspecified syncope type

## 2017-06-02 NOTE — PROGRESS NOTE ADULT - PROBLEM SELECTOR PLAN 1
unclear etiology.  cardiology f/u appreciated.
- Admit to Tele  - CT head done - no acute abnormality  - Evaluated by Neuro (harjinder) and cleared. No concern for acute neuro etiology of episode.  -Was likely secondary to dehydration.
unclear etiology.  f/up echo.  appreciate cardio input.  likely discharge tomorrow

## 2017-06-02 NOTE — PROGRESS NOTE ADULT - PROBLEM SELECTOR PLAN 6
Continue Sertraline
- fs  before meals and at bedtime  - Lispro sliding scale coverage
- fs  before meals and at bedtime  - Lispro sliding scale coverage

## 2017-06-02 NOTE — PROGRESS NOTE ADULT - PROBLEM SELECTOR PLAN 2
- DASH Diet  - Continue Metoprolol.    - Monitor BP.  check orthostatics
CKD III: creatinine stable
Creatinine mildly improved after boluses.   IV fluids x 12 hours.  Recheck BMP in AM  Check renal U/S

## 2017-06-02 NOTE — PROGRESS NOTE ADULT - PROBLEM SELECTOR PLAN 7
Likely multifactorial, dehydration and deconditioning.   PT eval done - pt walked better today - can dc to assisted living.
- Monitor clinically
- Monitor clinically

## 2017-06-02 NOTE — PROGRESS NOTE ADULT - ASSESSMENT
89 y/o F pt with hx of HTN, HLD, Diabetes, Dementia presents to ED BIBA from nursing home for unresponsiveness and syncope. No history of fall or injury.
89 y/o F pt with hx of HTN, HLD, Diabetes, Dementia presents to ED BIBA from nursing home for unresponsiveness and syncope. No history of fall or injury.
89 y/o F pt with hx of HTN, HLD, Diabetes, Dementia presents to ED BIBA from nursing home for unresponsiveness and syncope. No history of fall or injury.

## 2017-06-02 NOTE — PROGRESS NOTE ADULT - PROBLEM SELECTOR PLAN 3
- Continue Synthroid 75 mcg daily
- DASH Diet  - Continue Metoprolol.    - Monitor BP.  check orthostatics
- DASH Diet  - Continue Metoprolol. Will hold ACEi in setting of ANGIE.   - Monitor BP

## 2017-06-02 NOTE — PROGRESS NOTE ADULT - PROBLEM SELECTOR PLAN 4
- fs  before meals and at bedtime  - Lispro sliding scale coverage
- Continue Synthroid 75 mcg daily
- Monitor thyroid function tests  - Continue Synthroid 75 mcg daily

## 2017-06-05 LAB
CULTURE RESULTS: SIGNIFICANT CHANGE UP
CULTURE RESULTS: SIGNIFICANT CHANGE UP
SPECIMEN SOURCE: SIGNIFICANT CHANGE UP
SPECIMEN SOURCE: SIGNIFICANT CHANGE UP

## 2018-02-20 ENCOUNTER — INPATIENT (INPATIENT)
Facility: HOSPITAL | Age: 83
LOS: 6 days | Discharge: NURSING, REHAB OR LONG-TERM CARE | End: 2018-02-27
Attending: INTERNAL MEDICINE | Admitting: INTERNAL MEDICINE
Payer: MEDICARE

## 2018-02-20 VITALS
HEART RATE: 70 BPM | DIASTOLIC BLOOD PRESSURE: 80 MMHG | WEIGHT: 149.91 LBS | HEIGHT: 62 IN | RESPIRATION RATE: 16 BRPM | OXYGEN SATURATION: 100 % | SYSTOLIC BLOOD PRESSURE: 148 MMHG | TEMPERATURE: 100 F

## 2018-02-20 DIAGNOSIS — Z98.89 OTHER SPECIFIED POSTPROCEDURAL STATES: Chronic | ICD-10-CM

## 2018-02-21 LAB
ALBUMIN SERPL ELPH-MCNC: 3.7 G/DL — SIGNIFICANT CHANGE UP (ref 3.3–5)
ALP SERPL-CCNC: 79 U/L — SIGNIFICANT CHANGE UP (ref 40–120)
ALT FLD-CCNC: 17 U/L — SIGNIFICANT CHANGE UP (ref 12–78)
ANION GAP SERPL CALC-SCNC: 5 MMOL/L — SIGNIFICANT CHANGE UP (ref 5–17)
ANISOCYTOSIS BLD QL: SLIGHT — SIGNIFICANT CHANGE UP
APPEARANCE UR: CLEAR — SIGNIFICANT CHANGE UP
APTT BLD: 28.8 SEC — SIGNIFICANT CHANGE UP (ref 27.5–37.4)
AST SERPL-CCNC: 20 U/L — SIGNIFICANT CHANGE UP (ref 15–37)
BACTERIA # UR AUTO: (no result)
BASOPHILS # BLD AUTO: 0.1 K/UL — SIGNIFICANT CHANGE UP (ref 0–0.2)
BASOPHILS NFR BLD AUTO: 1.4 % — SIGNIFICANT CHANGE UP (ref 0–2)
BILIRUB SERPL-MCNC: 0.2 MG/DL — SIGNIFICANT CHANGE UP (ref 0.2–1.2)
BILIRUB UR-MCNC: NEGATIVE — SIGNIFICANT CHANGE UP
BLD GP AB SCN SERPL QL: SIGNIFICANT CHANGE UP
BUN SERPL-MCNC: 26 MG/DL — HIGH (ref 7–23)
CALCIUM SERPL-MCNC: 9 MG/DL — SIGNIFICANT CHANGE UP (ref 8.5–10.1)
CHLORIDE SERPL-SCNC: 104 MMOL/L — SIGNIFICANT CHANGE UP (ref 96–108)
CO2 SERPL-SCNC: 27 MMOL/L — SIGNIFICANT CHANGE UP (ref 22–31)
COLOR SPEC: YELLOW — SIGNIFICANT CHANGE UP
CREAT SERPL-MCNC: 1.84 MG/DL — HIGH (ref 0.5–1.3)
DIFF PNL FLD: (no result)
EOSINOPHIL # BLD AUTO: 0.1 K/UL — SIGNIFICANT CHANGE UP (ref 0–0.5)
EOSINOPHIL NFR BLD AUTO: 2.2 % — SIGNIFICANT CHANGE UP (ref 0–6)
EPI CELLS # UR: (no result)
FLUAV H1 2009 PAND RNA SPEC QL NAA+PROBE: DETECTED
GIANT PLATELETS BLD QL SMEAR: PRESENT — SIGNIFICANT CHANGE UP
GLUCOSE BLDC GLUCOMTR-MCNC: 92 MG/DL — SIGNIFICANT CHANGE UP (ref 70–99)
GLUCOSE BLDC GLUCOMTR-MCNC: 96 MG/DL — SIGNIFICANT CHANGE UP (ref 70–99)
GLUCOSE SERPL-MCNC: 114 MG/DL — HIGH (ref 70–99)
GLUCOSE UR QL: NEGATIVE MG/DL — SIGNIFICANT CHANGE UP
HCT VFR BLD CALC: 30.6 % — LOW (ref 34.5–45)
HGB BLD-MCNC: 10.1 G/DL — LOW (ref 11.5–15.5)
INR BLD: 1.05 RATIO — SIGNIFICANT CHANGE UP (ref 0.88–1.16)
KETONES UR-MCNC: NEGATIVE — SIGNIFICANT CHANGE UP
LACTATE SERPL-SCNC: 1 MMOL/L — SIGNIFICANT CHANGE UP (ref 0.7–2)
LEUKOCYTE ESTERASE UR-ACNC: (no result)
LYMPHOCYTES # BLD AUTO: 0.7 K/UL — LOW (ref 1–3.3)
LYMPHOCYTES # BLD AUTO: 15.6 % — SIGNIFICANT CHANGE UP (ref 13–44)
MCHC RBC-ENTMCNC: 29.3 PG — SIGNIFICANT CHANGE UP (ref 27–34)
MCHC RBC-ENTMCNC: 33.1 GM/DL — SIGNIFICANT CHANGE UP (ref 32–36)
MCV RBC AUTO: 88.5 FL — SIGNIFICANT CHANGE UP (ref 80–100)
MICROCYTES BLD QL: SLIGHT — SIGNIFICANT CHANGE UP
MONOCYTES # BLD AUTO: 0.5 K/UL — SIGNIFICANT CHANGE UP (ref 0–0.9)
MONOCYTES NFR BLD AUTO: 11.4 % — SIGNIFICANT CHANGE UP (ref 2–14)
NEUTROPHILS # BLD AUTO: 3.2 K/UL — SIGNIFICANT CHANGE UP (ref 1.8–7.4)
NEUTROPHILS NFR BLD AUTO: 69.5 % — SIGNIFICANT CHANGE UP (ref 43–77)
NITRITE UR-MCNC: POSITIVE
OVALOCYTES BLD QL SMEAR: SLIGHT — SIGNIFICANT CHANGE UP
PH UR: 6.5 — SIGNIFICANT CHANGE UP (ref 5–8)
PLAT MORPH BLD: NORMAL — SIGNIFICANT CHANGE UP
PLATELET # BLD AUTO: 193 K/UL — SIGNIFICANT CHANGE UP (ref 150–400)
POIKILOCYTOSIS BLD QL AUTO: SLIGHT — SIGNIFICANT CHANGE UP
POTASSIUM SERPL-MCNC: 4.6 MMOL/L — SIGNIFICANT CHANGE UP (ref 3.5–5.3)
POTASSIUM SERPL-SCNC: 4.6 MMOL/L — SIGNIFICANT CHANGE UP (ref 3.5–5.3)
PROT SERPL-MCNC: 7.8 GM/DL — SIGNIFICANT CHANGE UP (ref 6–8.3)
PROT UR-MCNC: 15 MG/DL
PROTHROM AB SERPL-ACNC: 11.3 SEC — SIGNIFICANT CHANGE UP (ref 9.8–12.7)
RAPID RVP RESULT: DETECTED
RBC # BLD: 3.46 M/UL — LOW (ref 3.8–5.2)
RBC # FLD: 13.9 % — SIGNIFICANT CHANGE UP (ref 10.3–14.5)
RBC BLD AUTO: (no result)
RBC CASTS # UR COMP ASSIST: (no result) /HPF (ref 0–4)
ROULEAUX BLD QL SMEAR: PRESENT — SIGNIFICANT CHANGE UP
SODIUM SERPL-SCNC: 136 MMOL/L — SIGNIFICANT CHANGE UP (ref 135–145)
SP GR SPEC: 1.01 — SIGNIFICANT CHANGE UP (ref 1.01–1.02)
TYPE + AB SCN PNL BLD: SIGNIFICANT CHANGE UP
UROBILINOGEN FLD QL: NEGATIVE MG/DL — SIGNIFICANT CHANGE UP
WBC # BLD: 4.6 K/UL — SIGNIFICANT CHANGE UP (ref 3.8–10.5)
WBC # FLD AUTO: 4.6 K/UL — SIGNIFICANT CHANGE UP (ref 3.8–10.5)
WBC UR QL: >50

## 2018-02-21 PROCEDURE — 71045 X-RAY EXAM CHEST 1 VIEW: CPT | Mod: 26

## 2018-02-21 PROCEDURE — 99285 EMERGENCY DEPT VISIT HI MDM: CPT

## 2018-02-21 PROCEDURE — 93010 ELECTROCARDIOGRAM REPORT: CPT

## 2018-02-21 RX ORDER — SERTRALINE 25 MG/1
50 TABLET, FILM COATED ORAL DAILY
Qty: 0 | Refills: 0 | Status: DISCONTINUED | OUTPATIENT
Start: 2018-02-21 | End: 2018-02-27

## 2018-02-21 RX ORDER — CHOLESTYRAMINE 4 G/9G
4 POWDER, FOR SUSPENSION ORAL DAILY
Qty: 0 | Refills: 0 | Status: DISCONTINUED | OUTPATIENT
Start: 2018-02-21 | End: 2018-02-27

## 2018-02-21 RX ORDER — CEFTRIAXONE 500 MG/1
1000 INJECTION, POWDER, FOR SOLUTION INTRAMUSCULAR; INTRAVENOUS ONCE
Qty: 0 | Refills: 0 | Status: COMPLETED | OUTPATIENT
Start: 2018-02-21 | End: 2018-02-21

## 2018-02-21 RX ORDER — DEXTROSE 50 % IN WATER 50 %
25 SYRINGE (ML) INTRAVENOUS ONCE
Qty: 0 | Refills: 0 | Status: DISCONTINUED | OUTPATIENT
Start: 2018-02-21 | End: 2018-02-27

## 2018-02-21 RX ORDER — CEFTRIAXONE 500 MG/1
1 INJECTION, POWDER, FOR SOLUTION INTRAMUSCULAR; INTRAVENOUS ONCE
Qty: 0 | Refills: 0 | Status: DISCONTINUED | OUTPATIENT
Start: 2018-02-21 | End: 2018-02-21

## 2018-02-21 RX ORDER — METOPROLOL TARTRATE 50 MG
25 TABLET ORAL
Qty: 0 | Refills: 0 | Status: DISCONTINUED | OUTPATIENT
Start: 2018-02-21 | End: 2018-02-27

## 2018-02-21 RX ORDER — SODIUM CHLORIDE 9 MG/ML
1000 INJECTION, SOLUTION INTRAVENOUS
Qty: 0 | Refills: 0 | Status: DISCONTINUED | OUTPATIENT
Start: 2018-02-21 | End: 2018-02-26

## 2018-02-21 RX ORDER — DEXTROSE 50 % IN WATER 50 %
12.5 SYRINGE (ML) INTRAVENOUS ONCE
Qty: 0 | Refills: 0 | Status: DISCONTINUED | OUTPATIENT
Start: 2018-02-21 | End: 2018-02-27

## 2018-02-21 RX ORDER — SODIUM CHLORIDE 9 MG/ML
3 INJECTION INTRAMUSCULAR; INTRAVENOUS; SUBCUTANEOUS ONCE
Qty: 0 | Refills: 0 | Status: COMPLETED | OUTPATIENT
Start: 2018-02-21 | End: 2018-02-21

## 2018-02-21 RX ORDER — PANTOPRAZOLE SODIUM 20 MG/1
40 TABLET, DELAYED RELEASE ORAL
Qty: 0 | Refills: 0 | Status: DISCONTINUED | OUTPATIENT
Start: 2018-02-21 | End: 2018-02-27

## 2018-02-21 RX ORDER — MODAFINIL 200 MG/1
200 TABLET ORAL
Qty: 0 | Refills: 0 | Status: DISCONTINUED | OUTPATIENT
Start: 2018-02-21 | End: 2018-02-27

## 2018-02-21 RX ORDER — LEVOTHYROXINE SODIUM 125 MCG
75 TABLET ORAL DAILY
Qty: 0 | Refills: 0 | Status: DISCONTINUED | OUTPATIENT
Start: 2018-02-21 | End: 2018-02-27

## 2018-02-21 RX ORDER — ACETAMINOPHEN 500 MG
650 TABLET ORAL EVERY 6 HOURS
Qty: 0 | Refills: 0 | Status: DISCONTINUED | OUTPATIENT
Start: 2018-02-21 | End: 2018-02-27

## 2018-02-21 RX ORDER — DONEPEZIL HYDROCHLORIDE 10 MG/1
10 TABLET, FILM COATED ORAL AT BEDTIME
Qty: 0 | Refills: 0 | Status: DISCONTINUED | OUTPATIENT
Start: 2018-02-21 | End: 2018-02-27

## 2018-02-21 RX ORDER — HEPARIN SODIUM 5000 [USP'U]/ML
5000 INJECTION INTRAVENOUS; SUBCUTANEOUS EVERY 12 HOURS
Qty: 0 | Refills: 0 | Status: DISCONTINUED | OUTPATIENT
Start: 2018-02-21 | End: 2018-02-27

## 2018-02-21 RX ORDER — DEXTROSE 50 % IN WATER 50 %
1 SYRINGE (ML) INTRAVENOUS ONCE
Qty: 0 | Refills: 0 | Status: DISCONTINUED | OUTPATIENT
Start: 2018-02-21 | End: 2018-02-27

## 2018-02-21 RX ORDER — GLUCAGON INJECTION, SOLUTION 0.5 MG/.1ML
1 INJECTION, SOLUTION SUBCUTANEOUS ONCE
Qty: 0 | Refills: 0 | Status: DISCONTINUED | OUTPATIENT
Start: 2018-02-21 | End: 2018-02-27

## 2018-02-21 RX ORDER — CEFTRIAXONE 500 MG/1
INJECTION, POWDER, FOR SOLUTION INTRAMUSCULAR; INTRAVENOUS
Qty: 0 | Refills: 0 | Status: DISCONTINUED | OUTPATIENT
Start: 2018-02-21 | End: 2018-02-21

## 2018-02-21 RX ORDER — SODIUM CHLORIDE 9 MG/ML
500 INJECTION INTRAMUSCULAR; INTRAVENOUS; SUBCUTANEOUS
Qty: 0 | Refills: 0 | Status: COMPLETED | OUTPATIENT
Start: 2018-02-21 | End: 2018-02-21

## 2018-02-21 RX ORDER — SODIUM CHLORIDE 9 MG/ML
1000 INJECTION INTRAMUSCULAR; INTRAVENOUS; SUBCUTANEOUS
Qty: 0 | Refills: 0 | Status: DISCONTINUED | OUTPATIENT
Start: 2018-02-21 | End: 2018-02-24

## 2018-02-21 RX ORDER — INSULIN LISPRO 100/ML
VIAL (ML) SUBCUTANEOUS
Qty: 0 | Refills: 0 | Status: DISCONTINUED | OUTPATIENT
Start: 2018-02-21 | End: 2018-02-27

## 2018-02-21 RX ORDER — OXYBUTYNIN CHLORIDE 5 MG
5 TABLET ORAL DAILY
Qty: 0 | Refills: 0 | Status: DISCONTINUED | OUTPATIENT
Start: 2018-02-21 | End: 2018-02-27

## 2018-02-21 RX ORDER — CEFTRIAXONE 500 MG/1
1000 INJECTION, POWDER, FOR SOLUTION INTRAMUSCULAR; INTRAVENOUS EVERY 24 HOURS
Qty: 0 | Refills: 0 | Status: DISCONTINUED | OUTPATIENT
Start: 2018-02-21 | End: 2018-02-26

## 2018-02-21 RX ORDER — ACETAMINOPHEN 500 MG
650 TABLET ORAL ONCE
Qty: 0 | Refills: 0 | Status: COMPLETED | OUTPATIENT
Start: 2018-02-21 | End: 2018-02-21

## 2018-02-21 RX ORDER — FOLIC ACID 0.8 MG
1 TABLET ORAL DAILY
Qty: 0 | Refills: 0 | Status: DISCONTINUED | OUTPATIENT
Start: 2018-02-21 | End: 2018-02-27

## 2018-02-21 RX ORDER — DOCUSATE SODIUM 100 MG
100 CAPSULE ORAL
Qty: 0 | Refills: 0 | Status: DISCONTINUED | OUTPATIENT
Start: 2018-02-21 | End: 2018-02-27

## 2018-02-21 RX ORDER — LISINOPRIL 2.5 MG/1
5 TABLET ORAL DAILY
Qty: 0 | Refills: 0 | Status: DISCONTINUED | OUTPATIENT
Start: 2018-02-21 | End: 2018-02-27

## 2018-02-21 RX ADMIN — SODIUM CHLORIDE 80 MILLILITER(S): 9 INJECTION INTRAMUSCULAR; INTRAVENOUS; SUBCUTANEOUS at 11:21

## 2018-02-21 RX ADMIN — SODIUM CHLORIDE 2000 MILLILITER(S): 9 INJECTION INTRAMUSCULAR; INTRAVENOUS; SUBCUTANEOUS at 00:35

## 2018-02-21 RX ADMIN — SODIUM CHLORIDE 2000 MILLILITER(S): 9 INJECTION INTRAMUSCULAR; INTRAVENOUS; SUBCUTANEOUS at 00:36

## 2018-02-21 RX ADMIN — SODIUM CHLORIDE 2000 MILLILITER(S): 9 INJECTION INTRAMUSCULAR; INTRAVENOUS; SUBCUTANEOUS at 01:00

## 2018-02-21 RX ADMIN — Medication 25 MILLIGRAM(S): at 17:30

## 2018-02-21 RX ADMIN — CEFTRIAXONE 1000 MILLIGRAM(S): 500 INJECTION, POWDER, FOR SOLUTION INTRAMUSCULAR; INTRAVENOUS at 03:16

## 2018-02-21 RX ADMIN — Medication 30 MILLIGRAM(S): at 17:30

## 2018-02-21 RX ADMIN — Medication 30 MILLIGRAM(S): at 03:24

## 2018-02-21 RX ADMIN — DONEPEZIL HYDROCHLORIDE 10 MILLIGRAM(S): 10 TABLET, FILM COATED ORAL at 23:07

## 2018-02-21 RX ADMIN — Medication 1 MILLIGRAM(S): at 11:20

## 2018-02-21 RX ADMIN — HEPARIN SODIUM 5000 UNIT(S): 5000 INJECTION INTRAVENOUS; SUBCUTANEOUS at 17:29

## 2018-02-21 RX ADMIN — SODIUM CHLORIDE 2000 MILLILITER(S): 9 INJECTION INTRAMUSCULAR; INTRAVENOUS; SUBCUTANEOUS at 00:50

## 2018-02-21 RX ADMIN — SODIUM CHLORIDE 3 MILLILITER(S): 9 INJECTION INTRAMUSCULAR; INTRAVENOUS; SUBCUTANEOUS at 00:35

## 2018-02-21 RX ADMIN — SERTRALINE 50 MILLIGRAM(S): 25 TABLET, FILM COATED ORAL at 11:34

## 2018-02-21 RX ADMIN — CEFTRIAXONE 1000 MILLIGRAM(S): 500 INJECTION, POWDER, FOR SOLUTION INTRAMUSCULAR; INTRAVENOUS at 23:08

## 2018-02-21 RX ADMIN — PANTOPRAZOLE SODIUM 40 MILLIGRAM(S): 20 TABLET, DELAYED RELEASE ORAL at 18:31

## 2018-02-21 RX ADMIN — Medication 650 MILLIGRAM(S): at 00:35

## 2018-02-21 RX ADMIN — Medication 100 MILLIGRAM(S): at 17:29

## 2018-02-21 RX ADMIN — Medication 5 MILLIGRAM(S): at 11:34

## 2018-02-21 NOTE — H&P ADULT - ASSESSMENT
IMP:  UTI  Influenza    Dementia    Diabetes mellitus    Esophageal ulceration    High cholesterol    HLD (hyperlipidemia)    Hyperlipidemia    Hypertension    Hypothyroid    Sleep apnea      PLAN:  IV Hydration as needed  IV Antibiotic  TamiFlu  ID Eval.

## 2018-02-21 NOTE — ED ADULT NURSE REASSESSMENT NOTE - NS ED NURSE REASSESS COMMENT FT1
pt resting comfortably. hygiene care administered. VSS. Safety maintained, will continue to monitor. pt resting comfortably. hygiene care provided. VSS. Safety maintained, will continue to monitor.

## 2018-02-21 NOTE — H&P ADULT - HISTORY OF PRESENT ILLNESS
89 yo female biba from [place] [facility] for fever.  Pt has dementia and therefore is a poor historian

## 2018-02-21 NOTE — ED ADULT NURSE NOTE - OBJECTIVE STATEMENT
pt BIBA from [place] [facility] c/o of fever. as per EMS pt was also c/o pain, however pt denies. no other complaints. cough noted.

## 2018-02-21 NOTE — H&P ADULT - NSHPLABSRESULTS_GEN_ALL_CORE
10.1   4.6   )-----------( 193      ( 21 Feb 2018 00:01 )             30.6     02-21    136  |  104  |  26<H>  ----------------------------<  114<H>  4.6   |  27  |  1.84<H>    Ca    9.0      21 Feb 2018 00:01    TPro  7.8  /  Alb  3.7  /  TBili  0.2  /  DBili  x   /  AST  20  /  ALT  17  /  AlkPhos  79  02-21

## 2018-02-21 NOTE — H&P ADULT - PMH
Dementia    Dementia    Diabetes    Diabetes    Diabetes mellitus    Esophageal ulceration    High cholesterol    HLD (hyperlipidemia)    Hyperlipidemia    Hypertension    Hypertension    Hypothyroid    Hypothyroid    Sleep apnea

## 2018-02-21 NOTE — ED PROVIDER NOTE - OBJECTIVE STATEMENT
89 yo female biba from [place] [facility] for fever.  Pt has dementia and therefore is a poor historian

## 2018-02-21 NOTE — ED ADULT NURSE NOTE - CAS EDN DISCHARGE ASSESSMENT
Patient baseline mental status
I personally performed the services described in the documentation, reviewed the documentation recorded by the scribe in my presence and it accurately and completely records my words and action.

## 2018-02-21 NOTE — H&P ADULT - NSHPPHYSICALEXAM_GEN_ALL_CORE
· CONSTITUTIONAL: Well appearing, well nourished, awake, in no apparent distress.  · ENMT: Airway patent, Nasal mucosa clear. Mouth with normal mucosa. Throat has no vesicles, no oropharyngeal exudates and uvula is midline.  · EYES: Clear bilaterally, pupils equal, round and reactive to light.  · CARDIAC: Normal rate, regular rhythm.  Heart sounds S1, S2.  No murmurs, rubs or gallops.  · RESPIRATORY: Breath sounds clear and equal bilaterally.  · GASTROINTESTINAL: Abdomen soft, non-tender, no guarding.  · MUSCULOSKELETAL: Spine appears normal, range of motion is not limited, no muscle or joint tenderness  · NEUROLOGICAL: Alert and oriented, no focal deficits, no motor or sensory deficits.  · SKIN: Skin normal color for race, warm, dry and intact. No evidence of rash.  · PSYCHIATRIC: no apparent risk to self or others.  · HEME LYMPH: No adenopathy or splenomegaly. No cervical or inguinal lymphadenopathy.

## 2018-02-21 NOTE — ED ADULT NURSE REASSESSMENT NOTE - COMFORT CARE
11am rounding complete, vitals done no other assistance needed
7am rounding complete, vitals done no other assistance needed
plan of care explained
plan of care explained

## 2018-02-22 LAB
GLUCOSE BLDC GLUCOMTR-MCNC: 116 MG/DL — HIGH (ref 70–99)
GLUCOSE BLDC GLUCOMTR-MCNC: 81 MG/DL — SIGNIFICANT CHANGE UP (ref 70–99)
GLUCOSE BLDC GLUCOMTR-MCNC: 96 MG/DL — SIGNIFICANT CHANGE UP (ref 70–99)
HBA1C BLD-MCNC: 5.7 % — HIGH (ref 4–5.6)

## 2018-02-22 RX ORDER — FLUTICASONE PROPIONATE 50 MCG
1 SPRAY, SUSPENSION NASAL
Qty: 0 | Refills: 0 | COMMUNITY

## 2018-02-22 RX ORDER — SITAGLIPTIN 50 MG/1
1 TABLET, FILM COATED ORAL
Qty: 0 | Refills: 0 | COMMUNITY

## 2018-02-22 RX ORDER — FAMOTIDINE 10 MG/ML
1 INJECTION INTRAVENOUS
Qty: 0 | Refills: 0 | COMMUNITY

## 2018-02-22 RX ORDER — CHOLESTYRAMINE 4 G/9G
0 POWDER, FOR SUSPENSION ORAL
Qty: 0 | Refills: 0 | COMMUNITY

## 2018-02-22 RX ORDER — METOPROLOL TARTRATE 50 MG
0.5 TABLET ORAL
Qty: 0 | Refills: 0 | COMMUNITY

## 2018-02-22 RX ORDER — GLIMEPIRIDE 1 MG
1 TABLET ORAL
Qty: 0 | Refills: 0 | COMMUNITY

## 2018-02-22 RX ORDER — SODIUM CHLORIDE 0.65 %
1 AEROSOL, SPRAY (ML) NASAL
Qty: 0 | Refills: 0 | COMMUNITY

## 2018-02-22 RX ORDER — FLUTICASONE PROPIONATE 50 MCG
2 SPRAY, SUSPENSION NASAL
Qty: 0 | Refills: 0 | COMMUNITY

## 2018-02-22 RX ADMIN — CEFTRIAXONE 1000 MILLIGRAM(S): 500 INJECTION, POWDER, FOR SOLUTION INTRAMUSCULAR; INTRAVENOUS at 21:23

## 2018-02-22 RX ADMIN — CHOLESTYRAMINE 4 GRAM(S): 4 POWDER, FOR SUSPENSION ORAL at 08:55

## 2018-02-22 RX ADMIN — HEPARIN SODIUM 5000 UNIT(S): 5000 INJECTION INTRAVENOUS; SUBCUTANEOUS at 06:18

## 2018-02-22 RX ADMIN — Medication 30 MILLIGRAM(S): at 17:16

## 2018-02-22 RX ADMIN — Medication 1 MILLIGRAM(S): at 11:23

## 2018-02-22 RX ADMIN — Medication 100 MILLIGRAM(S): at 17:16

## 2018-02-22 RX ADMIN — Medication 75 MICROGRAM(S): at 06:17

## 2018-02-22 RX ADMIN — LISINOPRIL 5 MILLIGRAM(S): 2.5 TABLET ORAL at 06:25

## 2018-02-22 RX ADMIN — MODAFINIL 200 MILLIGRAM(S): 200 TABLET ORAL at 11:23

## 2018-02-22 RX ADMIN — DONEPEZIL HYDROCHLORIDE 10 MILLIGRAM(S): 10 TABLET, FILM COATED ORAL at 21:24

## 2018-02-22 RX ADMIN — Medication 25 MILLIGRAM(S): at 06:18

## 2018-02-22 RX ADMIN — PANTOPRAZOLE SODIUM 40 MILLIGRAM(S): 20 TABLET, DELAYED RELEASE ORAL at 06:17

## 2018-02-22 RX ADMIN — SERTRALINE 50 MILLIGRAM(S): 25 TABLET, FILM COATED ORAL at 11:23

## 2018-02-22 RX ADMIN — Medication 30 MILLIGRAM(S): at 06:18

## 2018-02-22 RX ADMIN — Medication 100 MILLIGRAM(S): at 06:17

## 2018-02-22 RX ADMIN — Medication 5 MILLIGRAM(S): at 11:23

## 2018-02-22 RX ADMIN — Medication 25 MILLIGRAM(S): at 17:16

## 2018-02-22 RX ADMIN — Medication 650 MILLIGRAM(S): at 11:39

## 2018-02-22 RX ADMIN — HEPARIN SODIUM 5000 UNIT(S): 5000 INJECTION INTRAVENOUS; SUBCUTANEOUS at 17:16

## 2018-02-23 LAB
-  AMIKACIN: SIGNIFICANT CHANGE UP
-  AMPICILLIN/SULBACTAM: SIGNIFICANT CHANGE UP
-  AMPICILLIN: SIGNIFICANT CHANGE UP
-  AZTREONAM: SIGNIFICANT CHANGE UP
-  CEFAZOLIN: SIGNIFICANT CHANGE UP
-  CEFEPIME: SIGNIFICANT CHANGE UP
-  CEFOXITIN: SIGNIFICANT CHANGE UP
-  CEFTAZIDIME: SIGNIFICANT CHANGE UP
-  CEFTRIAXONE: SIGNIFICANT CHANGE UP
-  CIPROFLOXACIN: SIGNIFICANT CHANGE UP
-  ERTAPENEM: SIGNIFICANT CHANGE UP
-  GENTAMICIN: SIGNIFICANT CHANGE UP
-  IMIPENEM: SIGNIFICANT CHANGE UP
-  LEVOFLOXACIN: SIGNIFICANT CHANGE UP
-  MEROPENEM: SIGNIFICANT CHANGE UP
-  NITROFURANTOIN: SIGNIFICANT CHANGE UP
-  PIPERACILLIN/TAZOBACTAM: SIGNIFICANT CHANGE UP
-  TOBRAMYCIN: SIGNIFICANT CHANGE UP
-  TRIMETHOPRIM/SULFAMETHOXAZOLE: SIGNIFICANT CHANGE UP
CULTURE RESULTS: SIGNIFICANT CHANGE UP
GLUCOSE BLDC GLUCOMTR-MCNC: 138 MG/DL — HIGH (ref 70–99)
GLUCOSE BLDC GLUCOMTR-MCNC: 171 MG/DL — HIGH (ref 70–99)
GLUCOSE BLDC GLUCOMTR-MCNC: 99 MG/DL — SIGNIFICANT CHANGE UP (ref 70–99)
METHOD TYPE: SIGNIFICANT CHANGE UP
ORGANISM # SPEC MICROSCOPIC CNT: SIGNIFICANT CHANGE UP
ORGANISM # SPEC MICROSCOPIC CNT: SIGNIFICANT CHANGE UP
SPECIMEN SOURCE: SIGNIFICANT CHANGE UP

## 2018-02-23 RX ADMIN — Medication 30 MILLIGRAM(S): at 06:14

## 2018-02-23 RX ADMIN — HEPARIN SODIUM 5000 UNIT(S): 5000 INJECTION INTRAVENOUS; SUBCUTANEOUS at 18:47

## 2018-02-23 RX ADMIN — Medication 25 MILLIGRAM(S): at 18:47

## 2018-02-23 RX ADMIN — MODAFINIL 200 MILLIGRAM(S): 200 TABLET ORAL at 12:50

## 2018-02-23 RX ADMIN — Medication 5 MILLIGRAM(S): at 12:51

## 2018-02-23 RX ADMIN — Medication 75 MICROGRAM(S): at 06:14

## 2018-02-23 RX ADMIN — CEFTRIAXONE 1000 MILLIGRAM(S): 500 INJECTION, POWDER, FOR SOLUTION INTRAMUSCULAR; INTRAVENOUS at 21:26

## 2018-02-23 RX ADMIN — Medication 2: at 12:51

## 2018-02-23 RX ADMIN — HEPARIN SODIUM 5000 UNIT(S): 5000 INJECTION INTRAVENOUS; SUBCUTANEOUS at 06:14

## 2018-02-23 RX ADMIN — PANTOPRAZOLE SODIUM 40 MILLIGRAM(S): 20 TABLET, DELAYED RELEASE ORAL at 06:14

## 2018-02-23 RX ADMIN — Medication 30 MILLIGRAM(S): at 18:47

## 2018-02-23 RX ADMIN — Medication 25 MILLIGRAM(S): at 06:14

## 2018-02-23 RX ADMIN — Medication 100 MILLIGRAM(S): at 18:47

## 2018-02-23 RX ADMIN — LISINOPRIL 5 MILLIGRAM(S): 2.5 TABLET ORAL at 06:14

## 2018-02-23 RX ADMIN — DONEPEZIL HYDROCHLORIDE 10 MILLIGRAM(S): 10 TABLET, FILM COATED ORAL at 21:26

## 2018-02-23 RX ADMIN — SERTRALINE 50 MILLIGRAM(S): 25 TABLET, FILM COATED ORAL at 12:51

## 2018-02-23 RX ADMIN — Medication 100 MILLIGRAM(S): at 06:14

## 2018-02-23 RX ADMIN — CHOLESTYRAMINE 4 GRAM(S): 4 POWDER, FOR SUSPENSION ORAL at 17:01

## 2018-02-23 RX ADMIN — Medication 1 MILLIGRAM(S): at 12:51

## 2018-02-23 NOTE — PHYSICAL THERAPY INITIAL EVALUATION ADULT - IMPAIRMENTS FOUND, PT EVAL
INFANT DAILY TREATMENT NOTE - SPEECH    Evaluation Date: 9/15/2017  Admission Date: 8/17/2017  Gestational Age: 28 1/7  Post Conceptual Age: 36w 2d  Day of Life: 29 days    Current Feeding Orders:   Breast Milk: Expressed Breast Milk   Use formula if no EB No  Cheek Support: No  Patient Goals Reviewed: Yes    PATIENT GOALS  GOAL #4 - Infant will tolerate full oral feeding with minimal stress cues and no overt clinical s/s of aspiration in 30 minutes or less:  In progress  GOAL #5 - Parent/caregiver will indep muscle strength/poor safety awareness/cognitive impairment/gait, locomotion, and balance/aerobic capacity/endurance

## 2018-02-23 NOTE — PHYSICAL THERAPY INITIAL EVALUATION ADULT - LEVEL OF INDEPENDENCE: SIT/STAND, REHAB EVAL
mild retropulsion on arising/moderate assist (50% patients effort)/minimum assist (75% patients effort)

## 2018-02-23 NOTE — PHYSICAL THERAPY INITIAL EVALUATION ADULT - GAIT DISTANCE, PT EVAL
40ft (limited to room on this encounter,pt will need to wear PPE to ambulate in [place] including gown & mask)

## 2018-02-23 NOTE — PHYSICAL THERAPY INITIAL EVALUATION ADULT - CRITERIA FOR SKILLED THERAPEUTIC INTERVENTIONS
risk reduction/prevention/rehab potential/anticipated discharge recommendation/functional limitations in following categories/impairments found/therapy frequency/predicted duration of therapy intervention/anticipated equipment needs at discharge

## 2018-02-24 LAB
ANION GAP SERPL CALC-SCNC: 8 MMOL/L — SIGNIFICANT CHANGE UP (ref 5–17)
BUN SERPL-MCNC: 14 MG/DL — SIGNIFICANT CHANGE UP (ref 7–23)
CALCIUM SERPL-MCNC: 7.7 MG/DL — LOW (ref 8.5–10.1)
CHLORIDE SERPL-SCNC: 108 MMOL/L — SIGNIFICANT CHANGE UP (ref 96–108)
CO2 SERPL-SCNC: 22 MMOL/L — SIGNIFICANT CHANGE UP (ref 22–31)
CREAT SERPL-MCNC: 0.99 MG/DL — SIGNIFICANT CHANGE UP (ref 0.5–1.3)
GLUCOSE BLDC GLUCOMTR-MCNC: 118 MG/DL — HIGH (ref 70–99)
GLUCOSE BLDC GLUCOMTR-MCNC: 143 MG/DL — HIGH (ref 70–99)
GLUCOSE BLDC GLUCOMTR-MCNC: 147 MG/DL — HIGH (ref 70–99)
GLUCOSE BLDC GLUCOMTR-MCNC: 97 MG/DL — SIGNIFICANT CHANGE UP (ref 70–99)
GLUCOSE SERPL-MCNC: 98 MG/DL — SIGNIFICANT CHANGE UP (ref 70–99)
HCT VFR BLD CALC: 25.3 % — LOW (ref 34.5–45)
HGB BLD-MCNC: 8.1 G/DL — LOW (ref 11.5–15.5)
MCHC RBC-ENTMCNC: 28.5 PG — SIGNIFICANT CHANGE UP (ref 27–34)
MCHC RBC-ENTMCNC: 32.1 GM/DL — SIGNIFICANT CHANGE UP (ref 32–36)
MCV RBC AUTO: 88.9 FL — SIGNIFICANT CHANGE UP (ref 80–100)
PLATELET # BLD AUTO: 126 K/UL — LOW (ref 150–400)
POTASSIUM SERPL-MCNC: 3.6 MMOL/L — SIGNIFICANT CHANGE UP (ref 3.5–5.3)
POTASSIUM SERPL-SCNC: 3.6 MMOL/L — SIGNIFICANT CHANGE UP (ref 3.5–5.3)
RBC # BLD: 2.85 M/UL — LOW (ref 3.8–5.2)
RBC # FLD: 13.5 % — SIGNIFICANT CHANGE UP (ref 10.3–14.5)
SODIUM SERPL-SCNC: 138 MMOL/L — SIGNIFICANT CHANGE UP (ref 135–145)
WBC # BLD: 3.9 K/UL — SIGNIFICANT CHANGE UP (ref 3.8–10.5)
WBC # FLD AUTO: 3.9 K/UL — SIGNIFICANT CHANGE UP (ref 3.8–10.5)

## 2018-02-24 RX ORDER — FUROSEMIDE 40 MG
20 TABLET ORAL ONCE
Qty: 0 | Refills: 0 | Status: COMPLETED | OUTPATIENT
Start: 2018-02-24 | End: 2018-02-24

## 2018-02-24 RX ADMIN — MODAFINIL 200 MILLIGRAM(S): 200 TABLET ORAL at 09:39

## 2018-02-24 RX ADMIN — PANTOPRAZOLE SODIUM 40 MILLIGRAM(S): 20 TABLET, DELAYED RELEASE ORAL at 06:11

## 2018-02-24 RX ADMIN — CEFTRIAXONE 1000 MILLIGRAM(S): 500 INJECTION, POWDER, FOR SOLUTION INTRAMUSCULAR; INTRAVENOUS at 23:31

## 2018-02-24 RX ADMIN — Medication 100 MILLIGRAM(S): at 05:57

## 2018-02-24 RX ADMIN — Medication 25 MILLIGRAM(S): at 18:29

## 2018-02-24 RX ADMIN — LISINOPRIL 5 MILLIGRAM(S): 2.5 TABLET ORAL at 05:57

## 2018-02-24 RX ADMIN — Medication 75 MICROGRAM(S): at 05:57

## 2018-02-24 RX ADMIN — HEPARIN SODIUM 5000 UNIT(S): 5000 INJECTION INTRAVENOUS; SUBCUTANEOUS at 18:28

## 2018-02-24 RX ADMIN — Medication 650 MILLIGRAM(S): at 06:11

## 2018-02-24 RX ADMIN — SODIUM CHLORIDE 80 MILLILITER(S): 9 INJECTION INTRAMUSCULAR; INTRAVENOUS; SUBCUTANEOUS at 05:57

## 2018-02-24 RX ADMIN — Medication 30 MILLIGRAM(S): at 06:18

## 2018-02-24 RX ADMIN — Medication 20 MILLIGRAM(S): at 06:40

## 2018-02-24 RX ADMIN — Medication 5 MILLIGRAM(S): at 14:19

## 2018-02-24 RX ADMIN — Medication 100 MILLIGRAM(S): at 18:29

## 2018-02-24 RX ADMIN — HEPARIN SODIUM 5000 UNIT(S): 5000 INJECTION INTRAVENOUS; SUBCUTANEOUS at 05:56

## 2018-02-24 RX ADMIN — Medication 25 MILLIGRAM(S): at 05:58

## 2018-02-24 RX ADMIN — Medication 30 MILLIGRAM(S): at 18:28

## 2018-02-24 RX ADMIN — Medication 1 MILLIGRAM(S): at 14:19

## 2018-02-24 RX ADMIN — CHOLESTYRAMINE 4 GRAM(S): 4 POWDER, FOR SUSPENSION ORAL at 10:51

## 2018-02-24 RX ADMIN — DONEPEZIL HYDROCHLORIDE 10 MILLIGRAM(S): 10 TABLET, FILM COATED ORAL at 22:39

## 2018-02-24 RX ADMIN — SERTRALINE 50 MILLIGRAM(S): 25 TABLET, FILM COATED ORAL at 14:18

## 2018-02-25 LAB
GLUCOSE BLDC GLUCOMTR-MCNC: 100 MG/DL — HIGH (ref 70–99)
GLUCOSE BLDC GLUCOMTR-MCNC: 112 MG/DL — HIGH (ref 70–99)
GLUCOSE BLDC GLUCOMTR-MCNC: 127 MG/DL — HIGH (ref 70–99)
GLUCOSE BLDC GLUCOMTR-MCNC: 158 MG/DL — HIGH (ref 70–99)

## 2018-02-25 RX ADMIN — LISINOPRIL 5 MILLIGRAM(S): 2.5 TABLET ORAL at 05:15

## 2018-02-25 RX ADMIN — HEPARIN SODIUM 5000 UNIT(S): 5000 INJECTION INTRAVENOUS; SUBCUTANEOUS at 17:46

## 2018-02-25 RX ADMIN — CEFTRIAXONE 1000 MILLIGRAM(S): 500 INJECTION, POWDER, FOR SOLUTION INTRAMUSCULAR; INTRAVENOUS at 23:07

## 2018-02-25 RX ADMIN — CHOLESTYRAMINE 4 GRAM(S): 4 POWDER, FOR SUSPENSION ORAL at 09:15

## 2018-02-25 RX ADMIN — Medication 1 MILLIGRAM(S): at 12:24

## 2018-02-25 RX ADMIN — PANTOPRAZOLE SODIUM 40 MILLIGRAM(S): 20 TABLET, DELAYED RELEASE ORAL at 05:19

## 2018-02-25 RX ADMIN — SERTRALINE 50 MILLIGRAM(S): 25 TABLET, FILM COATED ORAL at 12:25

## 2018-02-25 RX ADMIN — HEPARIN SODIUM 5000 UNIT(S): 5000 INJECTION INTRAVENOUS; SUBCUTANEOUS at 05:14

## 2018-02-25 RX ADMIN — Medication 25 MILLIGRAM(S): at 05:15

## 2018-02-25 RX ADMIN — Medication 30 MILLIGRAM(S): at 05:15

## 2018-02-25 RX ADMIN — Medication 75 MICROGRAM(S): at 05:15

## 2018-02-25 RX ADMIN — Medication 100 MILLIGRAM(S): at 05:14

## 2018-02-25 RX ADMIN — MODAFINIL 200 MILLIGRAM(S): 200 TABLET ORAL at 12:24

## 2018-02-25 RX ADMIN — DONEPEZIL HYDROCHLORIDE 10 MILLIGRAM(S): 10 TABLET, FILM COATED ORAL at 21:32

## 2018-02-25 RX ADMIN — Medication 30 MILLIGRAM(S): at 17:46

## 2018-02-25 RX ADMIN — Medication 25 MILLIGRAM(S): at 17:46

## 2018-02-25 RX ADMIN — Medication 5 MILLIGRAM(S): at 12:25

## 2018-02-26 LAB
CULTURE RESULTS: SIGNIFICANT CHANGE UP
CULTURE RESULTS: SIGNIFICANT CHANGE UP
GLUCOSE BLDC GLUCOMTR-MCNC: 125 MG/DL — HIGH (ref 70–99)
GLUCOSE BLDC GLUCOMTR-MCNC: 125 MG/DL — HIGH (ref 70–99)
GLUCOSE BLDC GLUCOMTR-MCNC: 126 MG/DL — HIGH (ref 70–99)
SPECIMEN SOURCE: SIGNIFICANT CHANGE UP
SPECIMEN SOURCE: SIGNIFICANT CHANGE UP

## 2018-02-26 RX ADMIN — SERTRALINE 50 MILLIGRAM(S): 25 TABLET, FILM COATED ORAL at 14:10

## 2018-02-26 RX ADMIN — PANTOPRAZOLE SODIUM 40 MILLIGRAM(S): 20 TABLET, DELAYED RELEASE ORAL at 05:29

## 2018-02-26 RX ADMIN — Medication 75 MICROGRAM(S): at 05:24

## 2018-02-26 RX ADMIN — Medication 25 MILLIGRAM(S): at 05:24

## 2018-02-26 RX ADMIN — Medication 100 MILLIGRAM(S): at 05:24

## 2018-02-26 RX ADMIN — HEPARIN SODIUM 5000 UNIT(S): 5000 INJECTION INTRAVENOUS; SUBCUTANEOUS at 18:58

## 2018-02-26 RX ADMIN — MODAFINIL 200 MILLIGRAM(S): 200 TABLET ORAL at 09:42

## 2018-02-26 RX ADMIN — Medication 30 MILLIGRAM(S): at 05:24

## 2018-02-26 RX ADMIN — CHOLESTYRAMINE 4 GRAM(S): 4 POWDER, FOR SUSPENSION ORAL at 11:07

## 2018-02-26 RX ADMIN — Medication 5 MILLIGRAM(S): at 14:11

## 2018-02-26 RX ADMIN — Medication 25 MILLIGRAM(S): at 18:58

## 2018-02-26 RX ADMIN — LISINOPRIL 5 MILLIGRAM(S): 2.5 TABLET ORAL at 05:24

## 2018-02-26 RX ADMIN — DONEPEZIL HYDROCHLORIDE 10 MILLIGRAM(S): 10 TABLET, FILM COATED ORAL at 21:56

## 2018-02-26 RX ADMIN — Medication 1 MILLIGRAM(S): at 14:10

## 2018-02-26 RX ADMIN — HEPARIN SODIUM 5000 UNIT(S): 5000 INJECTION INTRAVENOUS; SUBCUTANEOUS at 05:30

## 2018-02-26 NOTE — PROGRESS NOTE ADULT - ASSESSMENT
· Assessment		  IMP:  UTI  Influenza    Dementia    Diabetes mellitus    Esophageal ulceration    High cholesterol    HLD (hyperlipidemia)    Hyperlipidemia    Hypertension    Hypothyroid    Sleep apnea      PLAN:  IV Hydration as needed  IV Antibiotic  TamiFlu  ID Eval.

## 2018-02-26 NOTE — PROGRESS NOTE ADULT - SUBJECTIVE AND OBJECTIVE BOX
Chief Complaint:    HPI:  89 yo female biba from [place] [facility] for fever.  Pt has dementia and therefore is a poor historian (21 Feb 2018 10:10)    2/22/18  T103. Weak and lethargic. Has Influenza and UTI. awaiting ID input. Continue present Rx for now.     2/23/18 Afebrile today. More alert. Confused. OOB in chair today. Continue present Rx for now.    2/24/18 Still has deep cough. Afebrile. No new complaints. Continue present Rx for now.    2/25/18 OOB in chair. Pleasantly confused. D/C IV fluids. Continue Tamiflu for total of 5 days. Still has deep productive cough. Continue Rx.    REVIEW OF SYSTEMS:    CONSTITUTIONAL: No weakness, fevers or chills  EYES/ENT: No visual changes;  No vertigo or throat pain   NECK: No pain or stiffness  RESPIRATORY: No cough, wheezing, hemoptysis; No shortness of breath  CARDIOVASCULAR: No chest pain or palpitations  GASTROINTESTINAL: No abdominal or epigastric pain. No nausea, vomiting, or hematemesis; No diarrhea or constipation. No melena or hematochezia.  GENITOURINARY: No dysuria, frequency or hematuria  NEUROLOGICAL: No numbness or weakness  SKIN: No itching, burning, rashes, or lesions   All other review of systems is negative unless indicated above    Vital Signs Last 24 Hrs  T(C): 39.7 (22 Feb 2018 11:36), Max: 39.7 (22 Feb 2018 11:36)  T(F): 103.5 (22 Feb 2018 11:36), Max: 103.5 (22 Feb 2018 11:36)  HR: 78 (22 Feb 2018 11:50) (66 - 78)  BP: 132/104 (22 Feb 2018 05:42) (132/104 - 143/52)  BP(mean): --  RR: 18 (22 Feb 2018 11:50) (16 - 18)  SpO2: 94% (22 Feb 2018 11:50) (94% - 100%)    I&O's Summary      CAPILLARY BLOOD GLUCOSE      POCT Blood Glucose.: 96 mg/dL (22 Feb 2018 11:15)  POCT Blood Glucose.: 81 mg/dL (22 Feb 2018 07:32)  POCT Blood Glucose.: 96 mg/dL (21 Feb 2018 15:13)      PHYSICAL EXAM:     GENERAL APPEARANCE:  The patient is alert, oriented .  He is in no acute distress.  HEENT:  Head is normocephalic.    HEART:  Regular rate and rhythm. No murmurs, thrills or heaves.  LUNGS:  Revealed normal breath sounds .  No crackles or wheezes are heard.  ABDOMEN:  Soft, nontender, nondistended with good bowel sounds heard.  Inguinal area is normal.  EXTREMITIES:  Without cyanosis, clubbing or edema.   NEUROLOGICAL:  Gross nonfocal.  Skin:  Warm and dry without any rash.  There is no costovertebral angle tenderness.    Medications:  MEDICATIONS  (STANDING):  cefTRIAXone Injectable. 1000 milliGRAM(s) IV Push every 24 hours  cholestyramine Powder (Sugar-Free) 4 Gram(s) Oral daily  dextrose 5%. 1000 milliLiter(s) (50 mL/Hr) IV Continuous <Continuous>  dextrose 50% Injectable 12.5 Gram(s) IV Push once  dextrose 50% Injectable 25 Gram(s) IV Push once  dextrose 50% Injectable 25 Gram(s) IV Push once  docusate sodium 100 milliGRAM(s) Oral two times a day  donepezil 10 milliGRAM(s) Oral at bedtime  folic acid 1 milliGRAM(s) Oral daily  heparin  Injectable 5000 Unit(s) SubCutaneous every 12 hours  insulin lispro (HumaLOG) corrective regimen sliding scale   SubCutaneous three times a day before meals  levothyroxine 75 MICROGram(s) Oral daily  lisinopril 5 milliGRAM(s) Oral daily  metoprolol     tartrate 25 milliGRAM(s) Oral two times a day  modafinil 200 milliGRAM(s) Oral before breakfast  oseltamivir 30 milliGRAM(s) Oral two times a day  oxybutynin 5 milliGRAM(s) Oral daily  pantoprazole    Tablet 40 milliGRAM(s) Oral before breakfast  sertraline 50 milliGRAM(s) Oral daily  sodium chloride 0.9%. 1000 milliLiter(s) (80 mL/Hr) IV Continuous <Continuous>    MEDICATIONS  (PRN):  acetaminophen   Tablet 650 milliGRAM(s) Oral every 6 hours PRN For Temp greater than 38 C (100.4 F)  dextrose Gel 1 Dose(s) Oral once PRN Blood Glucose LESS THAN 70 milliGRAM(s)/deciliter  glucagon  Injectable 1 milliGRAM(s) IntraMuscular once PRN Glucose LESS THAN 70 milligrams/deciliter      Labs: All Labs Reviewed:                        10.1   4.6   )-----------( 193      ( 21 Feb 2018 00:01 )             30.6     02-21    136  |  104  |  26<H>  ----------------------------<  114<H>  4.6   |  27  |  1.84<H>    Ca    9.0      21 Feb 2018 00:01    TPro  7.8  /  Alb  3.7  /  TBili  0.2  /  DBili  x   /  AST  20  /  ALT  17  /  AlkPhos  79  02-21    PT/INR - ( 21 Feb 2018 00:01 )   PT: 11.3 sec;   INR: 1.05 ratio         PTT - ( 21 Feb 2018 00:01 )  PTT:28.8 sec      Blood Culture: Organism --  Gram Stain Blood -- Gram Stain --  Specimen Source .Blood None  Culture-Blood --    Organism --  Gram Stain Blood -- Gram Stain --  Specimen Source .Blood Blood-Peripheral  Culture-Blood --        RADIOLOGY / EKG:    DVT PPX:    Advance Directive:    Disposition:    Time Span:
Chief Complaint:    HPI:  89 yo female biba from [place] [facility] for fever.  Pt has dementia and therefore is a poor historian (21 Feb 2018 10:10)    2/22/18  T103. Weak and lethargic. Has Influenza and UTI. awaiting ID input. Continue present Rx for now.     2/23/18 Afebrile today. More alert. Confused. OOB in chair today. Continue present Rxx for now.    2/24/18 Still has deep cough. Afebrile. No new complaints. Continue present Rx for now.    REVIEW OF SYSTEMS:    CONSTITUTIONAL: No weakness, fevers or chills  EYES/ENT: No visual changes;  No vertigo or throat pain   NECK: No pain or stiffness  RESPIRATORY: No cough, wheezing, hemoptysis; No shortness of breath  CARDIOVASCULAR: No chest pain or palpitations  GASTROINTESTINAL: No abdominal or epigastric pain. No nausea, vomiting, or hematemesis; No diarrhea or constipation. No melena or hematochezia.  GENITOURINARY: No dysuria, frequency or hematuria  NEUROLOGICAL: No numbness or weakness  SKIN: No itching, burning, rashes, or lesions   All other review of systems is negative unless indicated above    Vital Signs Last 24 Hrs  T(C): 39.7 (22 Feb 2018 11:36), Max: 39.7 (22 Feb 2018 11:36)  T(F): 103.5 (22 Feb 2018 11:36), Max: 103.5 (22 Feb 2018 11:36)  HR: 78 (22 Feb 2018 11:50) (66 - 78)  BP: 132/104 (22 Feb 2018 05:42) (132/104 - 143/52)  BP(mean): --  RR: 18 (22 Feb 2018 11:50) (16 - 18)  SpO2: 94% (22 Feb 2018 11:50) (94% - 100%)    I&O's Summary      CAPILLARY BLOOD GLUCOSE      POCT Blood Glucose.: 96 mg/dL (22 Feb 2018 11:15)  POCT Blood Glucose.: 81 mg/dL (22 Feb 2018 07:32)  POCT Blood Glucose.: 96 mg/dL (21 Feb 2018 15:13)      PHYSICAL EXAM:     GENERAL APPEARANCE:  The patient is alert, oriented .  He is in no acute distress.  HEENT:  Head is normocephalic.    HEART:  Regular rate and rhythm. No murmurs, thrills or heaves.  LUNGS:  Revealed normal breath sounds .  No crackles or wheezes are heard.  ABDOMEN:  Soft, nontender, nondistended with good bowel sounds heard.  Inguinal area is normal.  EXTREMITIES:  Without cyanosis, clubbing or edema.   NEUROLOGICAL:  Gross nonfocal.  Skin:  Warm and dry without any rash.  There is no costovertebral angle tenderness.    Medications:  MEDICATIONS  (STANDING):  cefTRIAXone Injectable. 1000 milliGRAM(s) IV Push every 24 hours  cholestyramine Powder (Sugar-Free) 4 Gram(s) Oral daily  dextrose 5%. 1000 milliLiter(s) (50 mL/Hr) IV Continuous <Continuous>  dextrose 50% Injectable 12.5 Gram(s) IV Push once  dextrose 50% Injectable 25 Gram(s) IV Push once  dextrose 50% Injectable 25 Gram(s) IV Push once  docusate sodium 100 milliGRAM(s) Oral two times a day  donepezil 10 milliGRAM(s) Oral at bedtime  folic acid 1 milliGRAM(s) Oral daily  heparin  Injectable 5000 Unit(s) SubCutaneous every 12 hours  insulin lispro (HumaLOG) corrective regimen sliding scale   SubCutaneous three times a day before meals  levothyroxine 75 MICROGram(s) Oral daily  lisinopril 5 milliGRAM(s) Oral daily  metoprolol     tartrate 25 milliGRAM(s) Oral two times a day  modafinil 200 milliGRAM(s) Oral before breakfast  oseltamivir 30 milliGRAM(s) Oral two times a day  oxybutynin 5 milliGRAM(s) Oral daily  pantoprazole    Tablet 40 milliGRAM(s) Oral before breakfast  sertraline 50 milliGRAM(s) Oral daily  sodium chloride 0.9%. 1000 milliLiter(s) (80 mL/Hr) IV Continuous <Continuous>    MEDICATIONS  (PRN):  acetaminophen   Tablet 650 milliGRAM(s) Oral every 6 hours PRN For Temp greater than 38 C (100.4 F)  dextrose Gel 1 Dose(s) Oral once PRN Blood Glucose LESS THAN 70 milliGRAM(s)/deciliter  glucagon  Injectable 1 milliGRAM(s) IntraMuscular once PRN Glucose LESS THAN 70 milligrams/deciliter      Labs: All Labs Reviewed:                        10.1   4.6   )-----------( 193      ( 21 Feb 2018 00:01 )             30.6     02-21    136  |  104  |  26<H>  ----------------------------<  114<H>  4.6   |  27  |  1.84<H>    Ca    9.0      21 Feb 2018 00:01    TPro  7.8  /  Alb  3.7  /  TBili  0.2  /  DBili  x   /  AST  20  /  ALT  17  /  AlkPhos  79  02-21    PT/INR - ( 21 Feb 2018 00:01 )   PT: 11.3 sec;   INR: 1.05 ratio         PTT - ( 21 Feb 2018 00:01 )  PTT:28.8 sec      Blood Culture: Organism --  Gram Stain Blood -- Gram Stain --  Specimen Source .Blood None  Culture-Blood --    Organism --  Gram Stain Blood -- Gram Stain --  Specimen Source .Blood Blood-Peripheral  Culture-Blood --        RADIOLOGY / EKG:    DVT PPX:    Advance Directive:    Disposition:    Time Span:
Chief Complaint:    HPI:  89 yo female biba from [place] [facility] for fever.  Pt has dementia and therefore is a poor historian (21 Feb 2018 10:10)    2/22/18  T103. Weak and lethargic. Has Influenza and UTI. awaiting ID input. Continue present Rx for now.         REVIEW OF SYSTEMS:    CONSTITUTIONAL: No weakness, fevers or chills  EYES/ENT: No visual changes;  No vertigo or throat pain   NECK: No pain or stiffness  RESPIRATORY: No cough, wheezing, hemoptysis; No shortness of breath  CARDIOVASCULAR: No chest pain or palpitations  GASTROINTESTINAL: No abdominal or epigastric pain. No nausea, vomiting, or hematemesis; No diarrhea or constipation. No melena or hematochezia.  GENITOURINARY: No dysuria, frequency or hematuria  NEUROLOGICAL: No numbness or weakness  SKIN: No itching, burning, rashes, or lesions   All other review of systems is negative unless indicated above    Vital Signs Last 24 Hrs  T(C): 39.7 (22 Feb 2018 11:36), Max: 39.7 (22 Feb 2018 11:36)  T(F): 103.5 (22 Feb 2018 11:36), Max: 103.5 (22 Feb 2018 11:36)  HR: 78 (22 Feb 2018 11:50) (66 - 78)  BP: 132/104 (22 Feb 2018 05:42) (132/104 - 143/52)  BP(mean): --  RR: 18 (22 Feb 2018 11:50) (16 - 18)  SpO2: 94% (22 Feb 2018 11:50) (94% - 100%)    I&O's Summary      CAPILLARY BLOOD GLUCOSE      POCT Blood Glucose.: 96 mg/dL (22 Feb 2018 11:15)  POCT Blood Glucose.: 81 mg/dL (22 Feb 2018 07:32)  POCT Blood Glucose.: 96 mg/dL (21 Feb 2018 15:13)      PHYSICAL EXAM:     GENERAL APPEARANCE:  The patient is alert, oriented .  He is in no acute distress.  HEENT:  Head is normocephalic.    HEART:  Regular rate and rhythm. No murmurs, thrills or heaves.  LUNGS:  Revealed normal breath sounds .  No crackles or wheezes are heard.  ABDOMEN:  Soft, nontender, nondistended with good bowel sounds heard.  Inguinal area is normal.  EXTREMITIES:  Without cyanosis, clubbing or edema.   NEUROLOGICAL:  Gross nonfocal.  Skin:  Warm and dry without any rash.  There is no costovertebral angle tenderness.    Medications:  MEDICATIONS  (STANDING):  cefTRIAXone Injectable. 1000 milliGRAM(s) IV Push every 24 hours  cholestyramine Powder (Sugar-Free) 4 Gram(s) Oral daily  dextrose 5%. 1000 milliLiter(s) (50 mL/Hr) IV Continuous <Continuous>  dextrose 50% Injectable 12.5 Gram(s) IV Push once  dextrose 50% Injectable 25 Gram(s) IV Push once  dextrose 50% Injectable 25 Gram(s) IV Push once  docusate sodium 100 milliGRAM(s) Oral two times a day  donepezil 10 milliGRAM(s) Oral at bedtime  folic acid 1 milliGRAM(s) Oral daily  heparin  Injectable 5000 Unit(s) SubCutaneous every 12 hours  insulin lispro (HumaLOG) corrective regimen sliding scale   SubCutaneous three times a day before meals  levothyroxine 75 MICROGram(s) Oral daily  lisinopril 5 milliGRAM(s) Oral daily  metoprolol     tartrate 25 milliGRAM(s) Oral two times a day  modafinil 200 milliGRAM(s) Oral before breakfast  oseltamivir 30 milliGRAM(s) Oral two times a day  oxybutynin 5 milliGRAM(s) Oral daily  pantoprazole    Tablet 40 milliGRAM(s) Oral before breakfast  sertraline 50 milliGRAM(s) Oral daily  sodium chloride 0.9%. 1000 milliLiter(s) (80 mL/Hr) IV Continuous <Continuous>    MEDICATIONS  (PRN):  acetaminophen   Tablet 650 milliGRAM(s) Oral every 6 hours PRN For Temp greater than 38 C (100.4 F)  dextrose Gel 1 Dose(s) Oral once PRN Blood Glucose LESS THAN 70 milliGRAM(s)/deciliter  glucagon  Injectable 1 milliGRAM(s) IntraMuscular once PRN Glucose LESS THAN 70 milligrams/deciliter      Labs: All Labs Reviewed:                        10.1   4.6   )-----------( 193      ( 21 Feb 2018 00:01 )             30.6     02-21    136  |  104  |  26<H>  ----------------------------<  114<H>  4.6   |  27  |  1.84<H>    Ca    9.0      21 Feb 2018 00:01    TPro  7.8  /  Alb  3.7  /  TBili  0.2  /  DBili  x   /  AST  20  /  ALT  17  /  AlkPhos  79  02-21    PT/INR - ( 21 Feb 2018 00:01 )   PT: 11.3 sec;   INR: 1.05 ratio         PTT - ( 21 Feb 2018 00:01 )  PTT:28.8 sec      Blood Culture: Organism --  Gram Stain Blood -- Gram Stain --  Specimen Source .Blood None  Culture-Blood --    Organism --  Gram Stain Blood -- Gram Stain --  Specimen Source .Blood Blood-Peripheral  Culture-Blood --        RADIOLOGY / EKG:    DVT PPX:    Advance Directive:    Disposition:    Time Span:
Chief Complaint:    HPI:  89 yo female biba from [place] [facility] for fever.  Pt has dementia and therefore is a poor historian (21 Feb 2018 10:10)    2/22/18  T103. Weak and lethargic. Has Influenza and UTI. awaiting ID input. Continue present Rx for now.     2/23/18 Afebrile today. More alert. Confused. OOB in chair today. Continue present Rx for now.    2/24/18 Still has deep cough. Afebrile. No new complaints. Continue present Rx for now.    2/25/18 OOB in chair. Pleasantly confused. D/C IV fluids. Continue Tamiflu for total of 5 days. Still has deep productive cough. Continue Rx.    2/26/18 Still has deep cough. Will try Robitussin DM. Can D/C IV fluids. D/C  Antibiotic.    REVIEW OF SYSTEMS:    CONSTITUTIONAL: No weakness, fevers or chills  EYES/ENT: No visual changes;  No vertigo or throat pain   NECK: No pain or stiffness  RESPIRATORY: No cough, wheezing, hemoptysis; No shortness of breath  CARDIOVASCULAR: No chest pain or palpitations  GASTROINTESTINAL: No abdominal or epigastric pain. No nausea, vomiting, or hematemesis; No diarrhea or constipation. No melena or hematochezia.  GENITOURINARY: No dysuria, frequency or hematuria  NEUROLOGICAL: No numbness or weakness  SKIN: No itching, burning, rashes, or lesions   All other review of systems is negative unless indicated above    Vital Signs Last 24 Hrs  T(C): 39.7 (22 Feb 2018 11:36), Max: 39.7 (22 Feb 2018 11:36)  T(F): 103.5 (22 Feb 2018 11:36), Max: 103.5 (22 Feb 2018 11:36)  HR: 78 (22 Feb 2018 11:50) (66 - 78)  BP: 132/104 (22 Feb 2018 05:42) (132/104 - 143/52)  BP(mean): --  RR: 18 (22 Feb 2018 11:50) (16 - 18)  SpO2: 94% (22 Feb 2018 11:50) (94% - 100%)    I&O's Summary      CAPILLARY BLOOD GLUCOSE      POCT Blood Glucose.: 96 mg/dL (22 Feb 2018 11:15)  POCT Blood Glucose.: 81 mg/dL (22 Feb 2018 07:32)  POCT Blood Glucose.: 96 mg/dL (21 Feb 2018 15:13)      PHYSICAL EXAM:     GENERAL APPEARANCE:  The patient is alert, oriented .  He is in no acute distress.  HEENT:  Head is normocephalic.    HEART:  Regular rate and rhythm. No murmurs, thrills or heaves.  LUNGS:  Revealed normal breath sounds .  No crackles or wheezes are heard.  ABDOMEN:  Soft, nontender, nondistended with good bowel sounds heard.  Inguinal area is normal.  EXTREMITIES:  Without cyanosis, clubbing or edema.   NEUROLOGICAL:  Gross nonfocal.  Skin:  Warm and dry without any rash.  There is no costovertebral angle tenderness.    Medications:  MEDICATIONS  (STANDING):  cefTRIAXone Injectable. 1000 milliGRAM(s) IV Push every 24 hours  cholestyramine Powder (Sugar-Free) 4 Gram(s) Oral daily  dextrose 5%. 1000 milliLiter(s) (50 mL/Hr) IV Continuous <Continuous>  dextrose 50% Injectable 12.5 Gram(s) IV Push once  dextrose 50% Injectable 25 Gram(s) IV Push once  dextrose 50% Injectable 25 Gram(s) IV Push once  docusate sodium 100 milliGRAM(s) Oral two times a day  donepezil 10 milliGRAM(s) Oral at bedtime  folic acid 1 milliGRAM(s) Oral daily  heparin  Injectable 5000 Unit(s) SubCutaneous every 12 hours  insulin lispro (HumaLOG) corrective regimen sliding scale   SubCutaneous three times a day before meals  levothyroxine 75 MICROGram(s) Oral daily  lisinopril 5 milliGRAM(s) Oral daily  metoprolol     tartrate 25 milliGRAM(s) Oral two times a day  modafinil 200 milliGRAM(s) Oral before breakfast  oseltamivir 30 milliGRAM(s) Oral two times a day  oxybutynin 5 milliGRAM(s) Oral daily  pantoprazole    Tablet 40 milliGRAM(s) Oral before breakfast  sertraline 50 milliGRAM(s) Oral daily  sodium chloride 0.9%. 1000 milliLiter(s) (80 mL/Hr) IV Continuous <Continuous>    MEDICATIONS  (PRN):  acetaminophen   Tablet 650 milliGRAM(s) Oral every 6 hours PRN For Temp greater than 38 C (100.4 F)  dextrose Gel 1 Dose(s) Oral once PRN Blood Glucose LESS THAN 70 milliGRAM(s)/deciliter  glucagon  Injectable 1 milliGRAM(s) IntraMuscular once PRN Glucose LESS THAN 70 milligrams/deciliter      Labs: All Labs Reviewed:                        10.1   4.6   )-----------( 193      ( 21 Feb 2018 00:01 )             30.6     02-21    136  |  104  |  26<H>  ----------------------------<  114<H>  4.6   |  27  |  1.84<H>    Ca    9.0      21 Feb 2018 00:01    TPro  7.8  /  Alb  3.7  /  TBili  0.2  /  DBili  x   /  AST  20  /  ALT  17  /  AlkPhos  79  02-21    PT/INR - ( 21 Feb 2018 00:01 )   PT: 11.3 sec;   INR: 1.05 ratio         PTT - ( 21 Feb 2018 00:01 )  PTT:28.8 sec      Blood Culture: Organism --  Gram Stain Blood -- Gram Stain --  Specimen Source .Blood None  Culture-Blood --    Organism --  Gram Stain Blood -- Gram Stain --  Specimen Source .Blood Blood-Peripheral  Culture-Blood --        RADIOLOGY / EKG:    DVT PPX:    Advance Directive:    Disposition:    Time Span:
Chief Complaint:    HPI:  89 yo female biba from [place] [facility] for fever.  Pt has dementia and therefore is a poor historian (21 Feb 2018 10:10)    2/22/18  T103. Weak and lethargic. Has Influenza and UTI. awaiting ID input. Continue present Rx for now.     2/24/18 Afebrile today. More alert. Confused. OOB in chair today. Continue present Rxx for now.    REVIEW OF SYSTEMS:    CONSTITUTIONAL: No weakness, fevers or chills  EYES/ENT: No visual changes;  No vertigo or throat pain   NECK: No pain or stiffness  RESPIRATORY: No cough, wheezing, hemoptysis; No shortness of breath  CARDIOVASCULAR: No chest pain or palpitations  GASTROINTESTINAL: No abdominal or epigastric pain. No nausea, vomiting, or hematemesis; No diarrhea or constipation. No melena or hematochezia.  GENITOURINARY: No dysuria, frequency or hematuria  NEUROLOGICAL: No numbness or weakness  SKIN: No itching, burning, rashes, or lesions   All other review of systems is negative unless indicated above    Vital Signs Last 24 Hrs  T(C): 39.7 (22 Feb 2018 11:36), Max: 39.7 (22 Feb 2018 11:36)  T(F): 103.5 (22 Feb 2018 11:36), Max: 103.5 (22 Feb 2018 11:36)  HR: 78 (22 Feb 2018 11:50) (66 - 78)  BP: 132/104 (22 Feb 2018 05:42) (132/104 - 143/52)  BP(mean): --  RR: 18 (22 Feb 2018 11:50) (16 - 18)  SpO2: 94% (22 Feb 2018 11:50) (94% - 100%)    I&O's Summary      CAPILLARY BLOOD GLUCOSE      POCT Blood Glucose.: 96 mg/dL (22 Feb 2018 11:15)  POCT Blood Glucose.: 81 mg/dL (22 Feb 2018 07:32)  POCT Blood Glucose.: 96 mg/dL (21 Feb 2018 15:13)      PHYSICAL EXAM:     GENERAL APPEARANCE:  The patient is alert, oriented .  He is in no acute distress.  HEENT:  Head is normocephalic.    HEART:  Regular rate and rhythm. No murmurs, thrills or heaves.  LUNGS:  Revealed normal breath sounds .  No crackles or wheezes are heard.  ABDOMEN:  Soft, nontender, nondistended with good bowel sounds heard.  Inguinal area is normal.  EXTREMITIES:  Without cyanosis, clubbing or edema.   NEUROLOGICAL:  Gross nonfocal.  Skin:  Warm and dry without any rash.  There is no costovertebral angle tenderness.    Medications:  MEDICATIONS  (STANDING):  cefTRIAXone Injectable. 1000 milliGRAM(s) IV Push every 24 hours  cholestyramine Powder (Sugar-Free) 4 Gram(s) Oral daily  dextrose 5%. 1000 milliLiter(s) (50 mL/Hr) IV Continuous <Continuous>  dextrose 50% Injectable 12.5 Gram(s) IV Push once  dextrose 50% Injectable 25 Gram(s) IV Push once  dextrose 50% Injectable 25 Gram(s) IV Push once  docusate sodium 100 milliGRAM(s) Oral two times a day  donepezil 10 milliGRAM(s) Oral at bedtime  folic acid 1 milliGRAM(s) Oral daily  heparin  Injectable 5000 Unit(s) SubCutaneous every 12 hours  insulin lispro (HumaLOG) corrective regimen sliding scale   SubCutaneous three times a day before meals  levothyroxine 75 MICROGram(s) Oral daily  lisinopril 5 milliGRAM(s) Oral daily  metoprolol     tartrate 25 milliGRAM(s) Oral two times a day  modafinil 200 milliGRAM(s) Oral before breakfast  oseltamivir 30 milliGRAM(s) Oral two times a day  oxybutynin 5 milliGRAM(s) Oral daily  pantoprazole    Tablet 40 milliGRAM(s) Oral before breakfast  sertraline 50 milliGRAM(s) Oral daily  sodium chloride 0.9%. 1000 milliLiter(s) (80 mL/Hr) IV Continuous <Continuous>    MEDICATIONS  (PRN):  acetaminophen   Tablet 650 milliGRAM(s) Oral every 6 hours PRN For Temp greater than 38 C (100.4 F)  dextrose Gel 1 Dose(s) Oral once PRN Blood Glucose LESS THAN 70 milliGRAM(s)/deciliter  glucagon  Injectable 1 milliGRAM(s) IntraMuscular once PRN Glucose LESS THAN 70 milligrams/deciliter      Labs: All Labs Reviewed:                        10.1   4.6   )-----------( 193      ( 21 Feb 2018 00:01 )             30.6     02-21    136  |  104  |  26<H>  ----------------------------<  114<H>  4.6   |  27  |  1.84<H>    Ca    9.0      21 Feb 2018 00:01    TPro  7.8  /  Alb  3.7  /  TBili  0.2  /  DBili  x   /  AST  20  /  ALT  17  /  AlkPhos  79  02-21    PT/INR - ( 21 Feb 2018 00:01 )   PT: 11.3 sec;   INR: 1.05 ratio         PTT - ( 21 Feb 2018 00:01 )  PTT:28.8 sec      Blood Culture: Organism --  Gram Stain Blood -- Gram Stain --  Specimen Source .Blood None  Culture-Blood --    Organism --  Gram Stain Blood -- Gram Stain --  Specimen Source .Blood Blood-Peripheral  Culture-Blood --        RADIOLOGY / EKG:    DVT PPX:    Advance Directive:    Disposition:    Time Span:

## 2018-02-27 ENCOUNTER — TRANSCRIPTION ENCOUNTER (OUTPATIENT)
Age: 83
End: 2018-02-27

## 2018-02-27 VITALS — HEART RATE: 70 BPM | SYSTOLIC BLOOD PRESSURE: 150 MMHG | DIASTOLIC BLOOD PRESSURE: 57 MMHG

## 2018-02-27 LAB
ANION GAP SERPL CALC-SCNC: 8 MMOL/L — SIGNIFICANT CHANGE UP (ref 5–17)
BUN SERPL-MCNC: 14 MG/DL — SIGNIFICANT CHANGE UP (ref 7–23)
CALCIUM SERPL-MCNC: 8.9 MG/DL — SIGNIFICANT CHANGE UP (ref 8.5–10.1)
CHLORIDE SERPL-SCNC: 106 MMOL/L — SIGNIFICANT CHANGE UP (ref 96–108)
CO2 SERPL-SCNC: 24 MMOL/L — SIGNIFICANT CHANGE UP (ref 22–31)
CREAT SERPL-MCNC: 1.13 MG/DL — SIGNIFICANT CHANGE UP (ref 0.5–1.3)
GLUCOSE BLDC GLUCOMTR-MCNC: 101 MG/DL — HIGH (ref 70–99)
GLUCOSE BLDC GLUCOMTR-MCNC: 102 MG/DL — HIGH (ref 70–99)
GLUCOSE BLDC GLUCOMTR-MCNC: 118 MG/DL — HIGH (ref 70–99)
GLUCOSE SERPL-MCNC: 100 MG/DL — HIGH (ref 70–99)
HCT VFR BLD CALC: 29.6 % — LOW (ref 34.5–45)
HGB BLD-MCNC: 9.8 G/DL — LOW (ref 11.5–15.5)
MCHC RBC-ENTMCNC: 29 PG — SIGNIFICANT CHANGE UP (ref 27–34)
MCHC RBC-ENTMCNC: 33.2 GM/DL — SIGNIFICANT CHANGE UP (ref 32–36)
MCV RBC AUTO: 87.5 FL — SIGNIFICANT CHANGE UP (ref 80–100)
PLATELET # BLD AUTO: 181 K/UL — SIGNIFICANT CHANGE UP (ref 150–400)
POTASSIUM SERPL-MCNC: 3.8 MMOL/L — SIGNIFICANT CHANGE UP (ref 3.5–5.3)
POTASSIUM SERPL-SCNC: 3.8 MMOL/L — SIGNIFICANT CHANGE UP (ref 3.5–5.3)
RBC # BLD: 3.38 M/UL — LOW (ref 3.8–5.2)
RBC # FLD: 13.1 % — SIGNIFICANT CHANGE UP (ref 10.3–14.5)
SODIUM SERPL-SCNC: 138 MMOL/L — SIGNIFICANT CHANGE UP (ref 135–145)
WBC # BLD: 4.8 K/UL — SIGNIFICANT CHANGE UP (ref 3.8–10.5)
WBC # FLD AUTO: 4.8 K/UL — SIGNIFICANT CHANGE UP (ref 3.8–10.5)

## 2018-02-27 RX ORDER — ACETAMINOPHEN 500 MG
2 TABLET ORAL
Qty: 0 | Refills: 0 | COMMUNITY
Start: 2018-02-27

## 2018-02-27 RX ORDER — GUAIFENESIN/DEXTROMETHORPHAN 600MG-30MG
10 TABLET, EXTENDED RELEASE 12 HR ORAL
Qty: 0 | Refills: 0 | COMMUNITY
Start: 2018-02-27

## 2018-02-27 RX ADMIN — Medication 5 MILLIGRAM(S): at 11:29

## 2018-02-27 RX ADMIN — HEPARIN SODIUM 5000 UNIT(S): 5000 INJECTION INTRAVENOUS; SUBCUTANEOUS at 05:28

## 2018-02-27 RX ADMIN — MODAFINIL 200 MILLIGRAM(S): 200 TABLET ORAL at 08:01

## 2018-02-27 RX ADMIN — LISINOPRIL 5 MILLIGRAM(S): 2.5 TABLET ORAL at 05:28

## 2018-02-27 RX ADMIN — Medication 1 MILLIGRAM(S): at 11:28

## 2018-02-27 RX ADMIN — SERTRALINE 50 MILLIGRAM(S): 25 TABLET, FILM COATED ORAL at 11:29

## 2018-02-27 RX ADMIN — Medication 75 MICROGRAM(S): at 05:28

## 2018-02-27 RX ADMIN — PANTOPRAZOLE SODIUM 40 MILLIGRAM(S): 20 TABLET, DELAYED RELEASE ORAL at 05:33

## 2018-02-27 RX ADMIN — Medication 25 MILLIGRAM(S): at 05:29

## 2018-02-27 RX ADMIN — CHOLESTYRAMINE 4 GRAM(S): 4 POWDER, FOR SUSPENSION ORAL at 08:01

## 2018-02-27 RX ADMIN — Medication 25 MILLIGRAM(S): at 17:02

## 2018-02-27 RX ADMIN — HEPARIN SODIUM 5000 UNIT(S): 5000 INJECTION INTRAVENOUS; SUBCUTANEOUS at 17:02

## 2018-02-27 NOTE — DISCHARGE NOTE ADULT - SECONDARY DIAGNOSIS.
UTI (urinary tract infection) Hypertension Hyperlipidemia Hypothyroid Sleep apnea Status post hip surgery

## 2018-02-27 NOTE — DISCHARGE NOTE ADULT - CARE PROVIDER_API CALL
Edgardo Peng), Internal Medicine  [address] [place] [address]  Phone: [phone]  Fax: [phone]

## 2018-02-27 NOTE — DISCHARGE NOTE ADULT - MEDICATION SUMMARY - MEDICATIONS TO TAKE
I will START or STAY ON the medications listed below when I get home from the hospital:    acetaminophen 325 mg oral tablet  -- 2 tab(s) by mouth every 6 hours, As needed, For Temp greater than 38 C (100.4 F)  -- Indication: For fever    lisinopril 5 mg oral tablet  -- 1 tab(s) by mouth once a day  -- Do not take this drug if you are pregnant.  It is very important that you take or use this exactly as directed.  Do not skip doses or discontinue unless directed by your doctor.  Some non-prescription drugs may aggravate your condition.  Read all labels carefully.  If a warning appears, check with your doctor before taking.    -- Indication: For bp    sertraline 50 mg oral tablet  -- 1 tab(s) by mouth once a day  -- Indication: For Depression    Januvia 100 mg oral tablet  -- 1 tab(s) by mouth once a day  -- Indication: For Diabetes    glimepiride 1 mg oral tablet  -- 1 tab(s) by mouth once a day  -- Indication: For Diabetes    Questran Light 4 g/5 g oral powder for reconstitution  -- orally every other day  -- Indication: For cholesterol    metoprolol tartrate 25 mg oral tablet  -- 0.5 tab(s) by mouth 2 times a day  -- Indication: For bp    donepezil 10 mg oral tablet  -- 1 tab(s) by mouth once a day (at bedtime)  -- Indication: For Dementia    modafinil 200 mg oral tablet  -- 1 tab(s) by mouth once a day at 2pm  -- Indication: For sleepiness    famotidine 20 mg oral tablet  -- 1 tab(s) by mouth 2 times a day  -- Indication: For gerd    docusate sodium 100 mg oral capsule  -- 2 cap(s) by mouth once a day  -- Indication: For bm    omeprazole 20 mg oral delayed release capsule  -- 1 cap(s) by mouth once a day  -- Indication: For gerd    levothyroxine 75 mcg (0.075 mg) oral capsule  -- 1 cap(s) by mouth once a day  -- Indication: For thyroid    guaifenesin-dextromethorphan 100 mg-10 mg/5 mL oral liquid  -- 10 milliliter(s) by mouth 4 times a day  -- Indication: For cough    oxybutynin 5 mg oral tablet  -- 1 tab(s) by mouth once a day  -- Indication: For URINe    Calcium 600+D 600 mg-200 units oral tablet  --  by mouth 2 times a day  -- Indication: For supplement    folic acid 1 mg oral tablet  -- 1 tab(s) by mouth once a day  -- Indication: For anemia

## 2018-02-27 NOTE — DISCHARGE NOTE ADULT - CARE PLAN
Principal Discharge DX:	Influenza A  Goal:	maximize function  Assessment and plan of treatment:	Stable at present. continue present Rx  Secondary Diagnosis:	UTI (urinary tract infection)  Secondary Diagnosis:	Hypertension  Secondary Diagnosis:	Hyperlipidemia  Secondary Diagnosis:	Hypothyroid  Secondary Diagnosis:	Sleep apnea  Secondary Diagnosis:	Status post hip surgery

## 2018-02-27 NOTE — DISCHARGE NOTE ADULT - PATIENT PORTAL LINK FT
You can access the [facility] Patient Portal, offered by [facility], by registering with the following website: [email or web address]

## 2018-02-27 NOTE — DISCHARGE NOTE ADULT - HOSPITAL COURSE
89 yr old female admitted with Influenza and UTI. Treated successfully. Ready for discharge to rehab.

## 2018-03-01 NOTE — PROGRESS NOTE ADULT - PROBLEM/PLAN-7
Problem: Falls - Risk of  Goal: *Absence of Falls  Document Tomy Fall Risk and appropriate interventions in the flowsheet.    Outcome: Progressing Towards Goal  Fall Risk Interventions:  Mobility Interventions: Utilize walker, cane, or other assitive device, PT Consult for assist device competence, PT Consult for mobility concerns, Patient to call before getting OOB, Communicate number of staff needed for ambulation/transfer    Mentation Interventions: Adequate sleep, hydration, pain control    Medication Interventions: Patient to call before getting OOB, Teach patient to arise slowly    Elimination Interventions: Call light in reach, Patient to call for help with toileting needs
DISPLAY PLAN FREE TEXT

## 2018-03-02 DIAGNOSIS — Z88.8 ALLERGY STATUS TO OTHER DRUGS, MEDICAMENTS AND BIOLOGICAL SUBSTANCES STATUS: ICD-10-CM

## 2018-03-02 DIAGNOSIS — J10.1 INFLUENZA DUE TO OTHER IDENTIFIED INFLUENZA VIRUS WITH OTHER RESPIRATORY MANIFESTATIONS: ICD-10-CM

## 2018-03-02 DIAGNOSIS — E11.9 TYPE 2 DIABETES MELLITUS WITHOUT COMPLICATIONS: ICD-10-CM

## 2018-03-02 DIAGNOSIS — K22.10 ULCER OF ESOPHAGUS WITHOUT BLEEDING: ICD-10-CM

## 2018-03-02 DIAGNOSIS — R50.9 FEVER, UNSPECIFIED: ICD-10-CM

## 2018-03-02 DIAGNOSIS — I10 ESSENTIAL (PRIMARY) HYPERTENSION: ICD-10-CM

## 2018-03-02 DIAGNOSIS — E78.5 HYPERLIPIDEMIA, UNSPECIFIED: ICD-10-CM

## 2018-03-02 DIAGNOSIS — Z79.899 OTHER LONG TERM (CURRENT) DRUG THERAPY: ICD-10-CM

## 2018-03-02 DIAGNOSIS — F03.90 UNSPECIFIED DEMENTIA WITHOUT BEHAVIORAL DISTURBANCE: ICD-10-CM

## 2018-03-02 DIAGNOSIS — Z79.84 LONG TERM (CURRENT) USE OF ORAL HYPOGLYCEMIC DRUGS: ICD-10-CM

## 2018-03-02 DIAGNOSIS — N39.0 URINARY TRACT INFECTION, SITE NOT SPECIFIED: ICD-10-CM

## 2018-03-02 DIAGNOSIS — E03.9 HYPOTHYROIDISM, UNSPECIFIED: ICD-10-CM

## 2018-03-02 DIAGNOSIS — G47.30 SLEEP APNEA, UNSPECIFIED: ICD-10-CM

## 2019-05-17 NOTE — ED ADULT TRIAGE NOTE - BSA (M2)
Pt found suipne in bed with SD/CCU monitors, O2 NC, and bed alarm activated. Pt with mod edema bilateral UE's and min-mod edema bilateral LE's. 1.67

## 2020-07-30 NOTE — PATIENT PROFILE ADULT. - AS SC BRADEN SENSORY
ABELARDO RN reviewed and gave patient and family/friend discharge instructions on the phone, drop schedule and IOL card. No further questions at this time. Patient stated ready for discharge. (3) slightly limited

## 2020-09-03 NOTE — PHYSICAL THERAPY INITIAL EVALUATION ADULT - ASSISTIVE DEVICE: SCOOT/BRIDGE, REHAB EVAL
Quality 431: Preventive Care And Screening: Unhealthy Alcohol Use - Screening: Patient screened for unhealthy alcohol use using a single question and scores less than 2 times per year Detail Level: Detailed armrests of chair Quality 226: Preventive Care And Screening: Tobacco Use: Screening And Cessation Intervention: Patient screened for tobacco use and is an ex/non-smoker Quality 130: Documentation Of Current Medications In The Medical Record: Eligible clinician attests to documenting in the medical record the patient is not eligible for a current list of medications being obtained, updated, or reviewed by the eligible clinician

## 2021-04-29 NOTE — PROGRESS NOTE ADULT - PROBLEM/PLAN-10
DISPLAY PLAN FREE TEXT Vital Signs Last 24 Hrs  T(C): 36.9 (29 Apr 2021 06:25), Max: 36.9 (29 Apr 2021 06:25)  T(F): 98.4 (29 Apr 2021 06:25), Max: 98.4 (29 Apr 2021 06:25)  HR: --  BP: --  BP(mean): --  RR: --  SpO2: --

## 2021-12-09 NOTE — PHYSICAL THERAPY INITIAL EVALUATION ADULT - RANGE OF MOTION EXAMINATION, REHAB EVAL
From: Jaymie Stout  To: Stevie Carbajal  Sent: 12/8/2021 4:37 PM CST    Marito Horton,    We will keep your scheduled appointment at 9:45 am tomorrow. See you then.     Thanks,  Enid Valenzuela RN bilateral upper extremity ROM was WFL (within functional limits)/bilateral lower extremity ROM was WFL (within functional limits)

## 2022-12-22 NOTE — ED PROVIDER NOTE - RESPIRATORY, MLM
SURVEY:    You may be receiving a survey from [employer] regarding your visit today. Please complete the survey to enable us to provide the highest quality of care to you and your family. If you cannot score us a very good on any question, please call the office to discuss how we could have made your experience a very good one. Thank you. Breath sounds clear and equal bilaterally.

## 2023-02-21 NOTE — PHYSICAL THERAPY INITIAL EVALUATION ADULT - DIAGNOSIS, PT EVAL
advanced age >90,+Influenza,generalized weakness/debility diet modification/exercise/medication therapy

## 2024-07-03 NOTE — PATIENT PROFILE ADULT. - DOES PATIENT HAVE ADVANCE DIRECTIVE
Current Medications   Medication Instructions    Advair Diskus 250-50 mcg/dose diskus inhaler Use as prescribed    atorvastatin (Lipitor) 20 mg tablet Continue until night before surgery    atropine 1 % ophthalmic solution Use as prescribed    chlorthalidone (Hygroton) 25 mg tablet Continue until night before surgery    Eliquis 5 mg tablet Last dose 7/2/24    losartan (Cozaar) 50 mg tablet Continue until night before surgery    metoprolol tartrate (Lopressor) 100 mg tablet Take morning of surgery with sip of water, no other fluids     *Please bring rescue inhaler day of surgery*    NPO Instructions: Nothing to eat after midnight    Additional Instructions:     Enter through main entrance of [facility], located at [address]. Proceed to registration, located in the back right hand corner. You will need your ID and insurance card for registration. Please ensure you have a responsible adult to drive you home.     Take a shower the morning of or night before your procedure. After you shower avoid lotions, powders, deodorants or anything applied to the skin. If you wear contacts or glasses, wear the glasses. If you do not have glasses, please bring a case for your contacts. You may wear hearing aids and dentures, bring a case for them or we will provide one. Make sure you wear something loose and comfortable. Keep in mind your surgery type and wear something that will accommodate incisions or bandages. Please remove all jewelry.     You may have up to 13.5 ounces of clear liquids 2 hours before* your instructed ARRIVAL time(6:00)* to the hospital. You may take medications discussed during phone call with a small sip of water.    For further questions Deidre RODAS can be contacted at [phone] between 7AM-3PM.                                                   pt confused, unable to assess